# Patient Record
Sex: FEMALE | Race: WHITE | NOT HISPANIC OR LATINO | Employment: FULL TIME | URBAN - METROPOLITAN AREA
[De-identification: names, ages, dates, MRNs, and addresses within clinical notes are randomized per-mention and may not be internally consistent; named-entity substitution may affect disease eponyms.]

---

## 2017-02-16 RX ORDER — CITALOPRAM 20 MG/1
20 TABLET ORAL DAILY
Qty: 90 TABLET | Refills: 1 | Status: SHIPPED | OUTPATIENT
Start: 2017-02-16 | End: 2018-01-20 | Stop reason: SDUPTHER

## 2017-04-27 ENCOUNTER — OFFICE VISIT (OUTPATIENT)
Dept: INTERNAL MEDICINE | Facility: CLINIC | Age: 67
End: 2017-04-27

## 2017-04-27 VITALS
HEIGHT: 63 IN | HEART RATE: 73 BPM | TEMPERATURE: 97.4 F | OXYGEN SATURATION: 98 % | RESPIRATION RATE: 16 BRPM | DIASTOLIC BLOOD PRESSURE: 80 MMHG | WEIGHT: 133.8 LBS | SYSTOLIC BLOOD PRESSURE: 110 MMHG | BODY MASS INDEX: 23.71 KG/M2

## 2017-04-27 DIAGNOSIS — Z23 NEED FOR PNEUMOCOCCAL VACCINE: ICD-10-CM

## 2017-04-27 DIAGNOSIS — E05.90 HYPERTHYROIDISM: Primary | ICD-10-CM

## 2017-04-27 DIAGNOSIS — F32.9 REACTIVE DEPRESSION: ICD-10-CM

## 2017-04-27 DIAGNOSIS — Z11.59 NEED FOR HEPATITIS C SCREENING TEST: ICD-10-CM

## 2017-04-27 PROCEDURE — 99213 OFFICE O/P EST LOW 20 MIN: CPT | Performed by: INTERNAL MEDICINE

## 2017-04-27 RX ORDER — METHIMAZOLE 10 MG/1
10 TABLET ORAL DAILY
Qty: 90 TABLET | Refills: 1 | Status: SHIPPED | OUTPATIENT
Start: 2017-04-27 | End: 2018-09-15 | Stop reason: SDUPTHER

## 2017-04-27 RX ORDER — METHIMAZOLE 10 MG/1
10 TABLET ORAL DAILY
COMMUNITY
End: 2017-04-27 | Stop reason: SDUPTHER

## 2017-04-27 NOTE — PROGRESS NOTES
Subjective   Laverne Polk is a 67 y.o. female.     Chief Complaint   Patient presents with   • Thyroid Problem         Thyroid Problem   Presents for follow-up visit. Symptoms include anxiety, diarrhea and visual change. Patient reports no constipation, depressed mood, heat intolerance, hoarse voice, leg swelling, palpitations or tremors. The symptoms have been worsening.        The following portions of the patient's history were reviewed and updated as appropriate: allergies, current medications, past social history and problem list.    Outpatient Prescriptions Marked as Taking for the 4/27/17 encounter (Office Visit) with Berry Díaz MD   Medication Sig Dispense Refill   • citalopram (CeleXA) 20 MG tablet Take 1 tablet by mouth Daily. 90 tablet 1   • LORazepam (ATIVAN) 0.5 MG tablet Take 1 tablet by mouth At Night As Needed for anxiety. 30 tablet 2   • methIMAzole (TAPAZOLE) 10 MG tablet Take 10 mg by mouth Daily.         Review of Systems   Constitutional: Negative for chills and fever.   HENT: Negative for hoarse voice.    Cardiovascular: Negative for chest pain and palpitations.   Gastrointestinal: Positive for abdominal distention (midepigastric last night) and diarrhea. Negative for abdominal pain and constipation.   Endocrine: Negative for heat intolerance.   Neurological: Negative for tremors.   Psychiatric/Behavioral: The patient is nervous/anxious.        Objective   Vitals:    04/27/17 1500   BP: 110/80   Pulse: 73   Resp: 16   Temp: 97.4 °F (36.3 °C)   SpO2: 98%      Last Weight    04/27/17  1500   Weight: 133 lb 12.8 oz (60.7 kg)    [unfilled]  Body mass index is 23.7 kg/(m^2).      Physical Exam   Constitutional: She appears well-developed and well-nourished. No distress.   HENT:   Head: Normocephalic and atraumatic.   Neck: Carotid bruit is not present. No thyromegaly present.   Cardiovascular: Normal rate, regular rhythm, normal heart sounds and intact distal pulses.  Exam reveals no  gallop.    No murmur heard.  Pulmonary/Chest: Effort normal and breath sounds normal. No respiratory distress. She has no wheezes. She has no rales.   Abdominal: Soft. Bowel sounds are normal. She exhibits no mass. There is no tenderness. There is no guarding.         Problem List Items Addressed This Visit        Endocrine    Hyperthyroidism - Primary    Relevant Medications    methIMAzole (TAPAZOLE) 10 MG tablet    Other Relevant Orders    TSH    T3, Free    T4, Free    CBC & Differential    Comprehensive Metabolic Panel    Lipid Panel       Other    Depression    Relevant Orders    CBC & Differential    Comprehensive Metabolic Panel        Assessment/Plan   In today for recheck.  She's been off methimazole for up to 6 months.  She feels like her thyroid is getting high again.  She's had some increased diarrhea and floaters.  Feels like she swelling.  She restarted her methimazole 10 days ago.  She started 10 mg 2 or 3 times a day.  We'll check her annual lab work today including CBC, CMP, lipids.  Will also had a TSH, free T3, free T4.  We'll continue to monitor at six-month intervals.  Depression is doing very well.  She's getting  in the fall.  She's had a promotion at work.  She knows she is due for mammogram and she'll schedule that in the near future.  She is too busy right now.  Pneumovax today.  Prevnar in one year.  She has no interest in colonoscopy or zoster.         Dragon disclaimer:   Much of this encounter note is an electronic transcription/translation of spoken language to printed text. The electronic translation of spoken language may permit erroneous, or at times, nonsensical words or phrases to be inadvertently transcribed; Although I have reviewed the note for such errors, some may still exist.

## 2017-04-28 LAB
ALBUMIN SERPL-MCNC: 4.7 G/DL (ref 3.5–5.2)
ALBUMIN/GLOB SERPL: 1.5 G/DL
ALP SERPL-CCNC: 273 U/L (ref 39–117)
ALT SERPL-CCNC: 17 U/L (ref 1–33)
AST SERPL-CCNC: 17 U/L (ref 1–32)
BASOPHILS # BLD AUTO: 0.04 10*3/MM3 (ref 0–0.2)
BASOPHILS NFR BLD AUTO: 0.4 % (ref 0–1.5)
BILIRUB SERPL-MCNC: 0.2 MG/DL (ref 0.1–1.2)
BUN SERPL-MCNC: 14 MG/DL (ref 8–23)
BUN/CREAT SERPL: 19.2 (ref 7–25)
CALCIUM SERPL-MCNC: 10.5 MG/DL (ref 8.6–10.5)
CHLORIDE SERPL-SCNC: 99 MMOL/L (ref 98–107)
CHOLEST SERPL-MCNC: 172 MG/DL (ref 0–200)
CO2 SERPL-SCNC: 26.9 MMOL/L (ref 22–29)
CREAT SERPL-MCNC: 0.73 MG/DL (ref 0.57–1)
EOSINOPHIL # BLD AUTO: 0.21 10*3/MM3 (ref 0–0.7)
EOSINOPHIL NFR BLD AUTO: 2.2 % (ref 0.3–6.2)
ERYTHROCYTE [DISTWIDTH] IN BLOOD BY AUTOMATED COUNT: 14 % (ref 11.7–13)
GLOBULIN SER CALC-MCNC: 3.2 GM/DL
GLUCOSE SERPL-MCNC: 91 MG/DL (ref 65–99)
HCT VFR BLD AUTO: 40.3 % (ref 35.6–45.5)
HCV AB S/CO SERPL IA: <0.1 S/CO RATIO (ref 0–0.9)
HDLC SERPL-MCNC: 71 MG/DL (ref 40–60)
HGB BLD-MCNC: 13.2 G/DL (ref 11.9–15.5)
IMM GRANULOCYTES # BLD: 0.02 10*3/MM3 (ref 0–0.03)
IMM GRANULOCYTES NFR BLD: 0.2 % (ref 0–0.5)
LDLC SERPL CALC-MCNC: 77 MG/DL (ref 0–100)
LYMPHOCYTES # BLD AUTO: 3.24 10*3/MM3 (ref 0.9–4.8)
LYMPHOCYTES NFR BLD AUTO: 33.9 % (ref 19.6–45.3)
MCH RBC QN AUTO: 27.8 PG (ref 26.9–32)
MCHC RBC AUTO-ENTMCNC: 32.8 G/DL (ref 32.4–36.3)
MCV RBC AUTO: 84.8 FL (ref 80.5–98.2)
MONOCYTES # BLD AUTO: 0.8 10*3/MM3 (ref 0.2–1.2)
MONOCYTES NFR BLD AUTO: 8.4 % (ref 5–12)
NEUTROPHILS # BLD AUTO: 5.25 10*3/MM3 (ref 1.9–8.1)
NEUTROPHILS NFR BLD AUTO: 54.9 % (ref 42.7–76)
PLATELET # BLD AUTO: 321 10*3/MM3 (ref 140–500)
POTASSIUM SERPL-SCNC: 4.8 MMOL/L (ref 3.5–5.2)
PROT SERPL-MCNC: 7.9 G/DL (ref 6–8.5)
RBC # BLD AUTO: 4.75 10*6/MM3 (ref 3.9–5.2)
SODIUM SERPL-SCNC: 139 MMOL/L (ref 136–145)
T3FREE SERPL-MCNC: 5.5 PG/ML (ref 2–4.4)
T4 FREE SERPL-MCNC: 1.56 NG/DL (ref 0.93–1.7)
TRIGL SERPL-MCNC: 118 MG/DL (ref 0–150)
TSH SERPL DL<=0.005 MIU/L-ACNC: <0.005 MIU/ML (ref 0.27–4.2)
VLDLC SERPL CALC-MCNC: 23.6 MG/DL (ref 5–40)
WBC # BLD AUTO: 9.56 10*3/MM3 (ref 4.5–10.7)

## 2017-05-01 ENCOUNTER — TELEPHONE (OUTPATIENT)
Dept: INTERNAL MEDICINE | Facility: CLINIC | Age: 67
End: 2017-05-01

## 2017-05-01 RX ORDER — CEPHALEXIN 500 MG/1
500 CAPSULE ORAL 3 TIMES DAILY
Qty: 15 CAPSULE | Refills: 0 | Status: SHIPPED | OUTPATIENT
Start: 2017-05-01 | End: 2017-05-06

## 2017-07-10 RX ORDER — LORAZEPAM 0.5 MG/1
TABLET ORAL
Qty: 30 TABLET | Refills: 1
Start: 2017-07-10 | End: 2018-01-20 | Stop reason: SDUPTHER

## 2017-07-10 NOTE — TELEPHONE ENCOUNTER
Last Refill:  10.05.2017  Last Visit:    04.27.2017  Next Visit:    11.10.2017  Zach:        07.10.2017

## 2017-11-27 ENCOUNTER — OFFICE VISIT (OUTPATIENT)
Dept: INTERNAL MEDICINE | Facility: CLINIC | Age: 67
End: 2017-11-27

## 2017-11-27 VITALS
BODY MASS INDEX: 24.24 KG/M2 | SYSTOLIC BLOOD PRESSURE: 114 MMHG | OXYGEN SATURATION: 96 % | TEMPERATURE: 98.4 F | RESPIRATION RATE: 16 BRPM | WEIGHT: 136.8 LBS | DIASTOLIC BLOOD PRESSURE: 64 MMHG | HEART RATE: 77 BPM | HEIGHT: 63 IN

## 2017-11-27 DIAGNOSIS — E05.90 HYPERTHYROIDISM: ICD-10-CM

## 2017-11-27 DIAGNOSIS — N28.1 ACQUIRED COMPLEX RENAL CYST: ICD-10-CM

## 2017-11-27 DIAGNOSIS — Z23 NEED FOR IMMUNIZATION AGAINST INFLUENZA: Primary | ICD-10-CM

## 2017-11-27 PROCEDURE — G0439 PPPS, SUBSEQ VISIT: HCPCS | Performed by: INTERNAL MEDICINE

## 2017-11-27 PROCEDURE — G0008 ADMIN INFLUENZA VIRUS VAC: HCPCS | Performed by: INTERNAL MEDICINE

## 2017-11-27 PROCEDURE — 99213 OFFICE O/P EST LOW 20 MIN: CPT | Performed by: INTERNAL MEDICINE

## 2017-11-27 PROCEDURE — 90686 IIV4 VACC NO PRSV 0.5 ML IM: CPT | Performed by: INTERNAL MEDICINE

## 2017-11-27 NOTE — PROGRESS NOTES
Subjective   Laverne Polk is a 67 y.o. female.     Chief Complaint   Patient presents with   • Thyroid Problem         Thyroid Problem   Presents for follow-up visit. Symptoms include weight gain. Patient reports no anxiety, cold intolerance, hair loss, heat intolerance, palpitations, tremors or weight loss. The symptoms have been worsening.        The following portions of the patient's history were reviewed and updated as appropriate: allergies, current medications, past social history and problem list.    Outpatient Prescriptions Marked as Taking for the 11/27/17 encounter (Office Visit) with Berry Díaz MD   Medication Sig Dispense Refill   • citalopram (CeleXA) 20 MG tablet Take 1 tablet by mouth Daily. 90 tablet 1   • LORazepam (ATIVAN) 0.5 MG tablet TAKE 1 TABLET BY MOUTH AT BEDTIME AS NEEDED FOR ANXIETY 30 tablet 1       Review of Systems   Constitutional: Positive for weight gain. Negative for chills, fever and weight loss.   Respiratory: Negative for shortness of breath.    Cardiovascular: Negative for palpitations.   Endocrine: Negative for cold intolerance and heat intolerance.   Neurological: Negative for tremors.   Psychiatric/Behavioral: The patient is not nervous/anxious and is not hyperactive.        Objective   Vitals:    11/27/17 1406   BP: 114/64   Pulse: 77   Resp: 16   Temp: 98.4 °F (36.9 °C)   SpO2: 96%      Last Weight    11/27/17  1406   Weight: 136 lb 12.8 oz (62.1 kg)    [unfilled]  Body mass index is 24.23 kg/(m^2).      Physical Exam   Constitutional: She appears well-developed and well-nourished.   Cardiovascular: Normal rate, regular rhythm and normal heart sounds.  Exam reveals no friction rub.    No murmur heard.  Pulmonary/Chest: Effort normal and breath sounds normal. No respiratory distress. She has no wheezes. She has no rales.   Neurological: She has normal reflexes.         Problem List Items Addressed This Visit        Endocrine    Hyperthyroidism      Other Visit  Diagnoses     Need for immunization against influenza    -  Primary    Relevant Orders    Flu Vaccine Quad PF 3YR+ (Completed)        Assessment/Plan   Today with neck ache.  Indigestion-type ache in the upper abdomen over the past few days.  She's had her gallbladder out in the past.  She had a small cyst in the right kidney 2 years ago.  It actually collapsed from 26 down to 10 mm at the time but was complex.  She is going to need another CT of the abdomen to recheck this.  Current GI symptoms are nondescript.  She's due for recheck on her thyroid.  He been running hyperthyroid in the past for had gotten thyroid completely controlled.  She quit her methimazole about 3 months ago.  AWV today.  She knows she needs to schedule another mammogram.  His been about 4 years since last one.  Flu shot updated today.         Dragon disclaimer:   Much of this encounter note is an electronic transcription/translation of spoken language to printed text. The electronic translation of spoken language may permit erroneous, or at times, nonsensical words or phrases to be inadvertently transcribed; Although I have reviewed the note for such errors, some may still exist.

## 2017-11-27 NOTE — PROGRESS NOTES
QUICK REFERENCE INFORMATION:  The ABCs of the Annual Wellness Visit    Subsequent Medicare Wellness Visit    HEALTH RISK ASSESSMENT    1950    Recent Hospitalizations:  No hospitalization(s) within the last year..        Current Medical Providers:  Patient Care Team:  Berry Díaz MD as PCP - Internal Medicine        Smoking Status:  History   Smoking Status   • Never Smoker   Smokeless Tobacco   • Never Used       Alcohol Consumption:  History   Alcohol Use   • Yes     Comment: Wine: Occas       Depression Screen:   PHQ-2/PHQ-9 Depression Screening 11/27/2017   Little interest or pleasure in doing things 0   Feeling down, depressed, or hopeless 0   Trouble falling or staying asleep, or sleeping too much 3   Feeling tired or having little energy 1   Poor appetite or overeating 0   Feeling bad about yourself - or that you are a failure or have let yourself or your family down 0   Trouble concentrating on things, such as reading the newspaper or watching television 0   Moving or speaking so slowly that other people could have noticed. Or the opposite - being so fidgety or restless that you have been moving around a lot more than usual 0   Thoughts that you would be better off dead, or of hurting yourself in some way 0   Total Score 4   If you checked off any problems, how difficult have these problems made it for you to do your work, take care of things at home, or get along with other people? Somewhat difficult       Health Habits and Functional and Cognitive Screening:  Functional & Cognitive Status 11/27/2017   Do you have difficulty preparing food and eating? No   Do you have difficulty bathing yourself, getting dressed or grooming yourself? No   Do you have difficulty using the toilet? No   Do you have difficulty moving around from place to place? No   Do you have trouble with steps or getting out of a bed or a chair? No   In the past year have you fallen or experienced a near fall? No   Current Diet Well  Balanced Diet   Dental Exam Up to date   Eye Exam Up to date   Exercise (times per week) 5 times per week   Current Exercise Activities Include Walking   Do you need help using the phone?  No   Are you deaf or do you have serious difficulty hearing?  No   Do you need help with transportation? No   Do you need help shopping? No   Do you need help preparing meals?  No   Do you need help with housework?  No   Do you need help with laundry? No   Do you need help taking your medications? No   Do you need help managing money? No   Have you felt unusual stress, anger or loneliness in the last month? No   Who do you live with? Alone   If you need help, do you have trouble finding someone available to you? No   Have you been bothered in the last four weeks by sexual problems? No   Do you have difficulty concentrating, remembering or making decisions? No           Does the patient have evidence of cognitive impairment? No    Aspirin use counseling: Does not need ASA (and currently is not on it)      Recent Lab Results:  CMP:  Lab Results   Component Value Date    GLU 91 04/27/2017    BUN 14 04/27/2017    CREATININE 0.73 04/27/2017    EGFRIFNONA 79 04/27/2017    EGFRIFAFRI 96 04/27/2017    BCR 19.2 04/27/2017     04/27/2017    K 4.8 04/27/2017    CO2 26.9 04/27/2017    CALCIUM 10.5 04/27/2017    PROTENTOTREF 7.9 04/27/2017    ALBUMIN 4.70 04/27/2017    LABGLOBREF 3.2 04/27/2017    LABIL2 1.5 04/27/2017    BILITOT 0.2 04/27/2017    ALKPHOS 273 (H) 04/27/2017    AST 17 04/27/2017    ALT 17 04/27/2017     Lipid Panel:  Lab Results   Component Value Date    TRIG 118 04/27/2017    HDL 71 (H) 04/27/2017    VLDL 23.6 04/27/2017     HbA1c:       Visual Acuity:  No exam data present    Age-appropriate Screening Schedule:  Refer to the list below for future screening recommendations based on patient's age, sex and/or medical conditions. Orders for these recommended tests are listed in the plan section. The patient has been  "provided with a written plan.    Health Maintenance   Topic Date Due   • TDAP/TD VACCINES (1 - Tdap) 01/02/2011   • MAMMOGRAM  07/01/2016   • INFLUENZA VACCINE  08/01/2017   • PNEUMOCOCCAL VACCINES (65+ LOW/MEDIUM RISK) (2 of 2 - PCV13) 04/27/2018   • COLONOSCOPY  Excluded   • ZOSTER VACCINE  Excluded        Subjective   History of Present Illness    Laverne Polk is a 67 y.o. female who presents for an Subsequent Wellness Visit.    The following portions of the patient's history were reviewed and updated as appropriate: allergies, current medications, past family history, past medical history, past social history, past surgical history and problem list.    Outpatient Medications Prior to Visit   Medication Sig Dispense Refill   • citalopram (CeleXA) 20 MG tablet Take 1 tablet by mouth Daily. 90 tablet 1   • LORazepam (ATIVAN) 0.5 MG tablet TAKE 1 TABLET BY MOUTH AT BEDTIME AS NEEDED FOR ANXIETY 30 tablet 1   • methIMAzole (TAPAZOLE) 10 MG tablet Take 1 tablet by mouth Daily. 90 tablet 1     No facility-administered medications prior to visit.        Patient Active Problem List   Diagnosis   • Depression   • Hyperthyroidism   • AR (allergic rhinitis)       Advance Care Planning:  has NO advance directive - information provided to the patient today      Review of Systems    Compared to one year ago, the patient feels her physical health is the same.  Compared to one year ago, the patient feels her mental health is the same.    Objective     Physical Exam    Vitals:    11/27/17 1406   BP: 114/64   BP Location: Left arm   Patient Position: Sitting   Cuff Size: Adult   Pulse: 77   Resp: 16   Temp: 98.4 °F (36.9 °C)   TempSrc: Oral   SpO2: 96%   Weight: 136 lb 12.8 oz (62.1 kg)   Height: 63\" (160 cm)   PainSc:   2   PainLoc: Neck       Body mass index is 24.23 kg/(m^2).  Discussed the patient's BMI with her. The BMI is in the acceptable range.    Assessment/Plan   Patient Self-Management and Personalized Health " Advice  The patient has been provided with information about: diet, exercise, fall prevention and designing advance directives and preventive services including:   · Advance directive, Exercise counseling provided, Influenza vaccine, Nutrition counseling provided, Smoking cessation counseling completed.    Visit Diagnoses:    ICD-10-CM ICD-9-CM   1. Need for immunization against influenza Z23 V04.81       Orders Placed This Encounter   Procedures   • Flu Vaccine Quad PF 3YR+       Outpatient Encounter Prescriptions as of 11/27/2017   Medication Sig Dispense Refill   • citalopram (CeleXA) 20 MG tablet Take 1 tablet by mouth Daily. 90 tablet 1   • LORazepam (ATIVAN) 0.5 MG tablet TAKE 1 TABLET BY MOUTH AT BEDTIME AS NEEDED FOR ANXIETY 30 tablet 1   • methIMAzole (TAPAZOLE) 10 MG tablet Take 1 tablet by mouth Daily. 90 tablet 1     No facility-administered encounter medications on file as of 11/27/2017.        Reviewed use of high risk medication in the elderly: yes  Reviewed for potential of harmful drug interactions in the elderly: yes    Follow Up:  No Follow-up on file.     An After Visit Summary and PPPS with all of these plans were given to the patient.

## 2017-11-27 NOTE — PATIENT INSTRUCTIONS
Influenza Virus Vaccine injection  What is this medicine?  INFLUENZA VIRUS VACCINE (in [facility]) helps to reduce the risk of getting influenza also known as the flu. The vaccine only helps protect you against some strains of the flu.  This medicine may be used for other purposes; ask your health care provider or pharmacist if you have questions.  COMMON BRAND NAME(S): Afluria, Agriflu, Alfuria, FLUAD, Fluarix, Fluarix Quadrivalent, Flublok, FLUCELVAX, Flulaval, Fluvirin, Fluzone, Fluzone High-Dose, Fluzone Intradermal  What should I tell my health care provider before I take this medicine?  They need to know if you have any of these conditions:  -bleeding disorder like hemophilia  -fever or infection  -Guillain-[place] syndrome or other neurological problems  -immune system problems  -infection with the human immunodeficiency virus (HIV) or AIDS  -low blood platelet counts  -multiple sclerosis  -an unusual or allergic reaction to influenza virus vaccine, latex, other medicines, foods, dyes, or preservatives. Different brands of vaccines contain different allergens. Some may contain latex or eggs. Talk to your doctor about your allergies to make sure that you get the right vaccine.  -pregnant or trying to get pregnant  -breast-feeding  How should I use this medicine?  This vaccine is for injection into a muscle or under the skin. It is given by a health care professional.  A copy of Vaccine Information Statements will be given before each vaccination. Read this sheet carefully each time. The sheet may change frequently.  Talk to your healthcare provider to see which vaccines are right for you. Some vaccines should not be used in all age groups.  Overdosage: If you think you have taken too much of this medicine contact a poison control center or emergency room at once.  NOTE: This medicine is only for you. Do not share this medicine with others.  What if I miss a dose?  This does not apply.  What  may interact with this medicine?  -chemotherapy or radiation therapy  -medicines that lower your immune system like etanercept, anakinra, infliximab, and adalimumab  -medicines that treat or prevent blood clots like warfarin  -phenytoin  -steroid medicines like prednisone or cortisone  -theophylline  -vaccines  This list may not describe all possible interactions. Give your health care provider a list of all the medicines, herbs, non-prescription drugs, or dietary supplements you use. Also tell them if you smoke, drink alcohol, or use illegal drugs. Some items may interact with your medicine.  What should I watch for while using this medicine?  Report any side effects that do not go away within 3 days to your doctor or health care professional. Call your health care provider if any unusual symptoms occur within 6 weeks of receiving this vaccine.  You may still catch the flu, but the illness is not usually as bad. You cannot get the flu from the vaccine. The vaccine will not protect against colds or other illnesses that may cause fever. The vaccine is needed every year.  What side effects may I notice from receiving this medicine?  Side effects that you should report to your doctor or health care professional as soon as possible:  -allergic reactions like skin rash, itching or hives, swelling of the face, lips, or tongue  Side effects that usually do not require medical attention (report to your doctor or health care professional if they continue or are bothersome):  -fever  -headache  -muscle aches and pains  -pain, tenderness, redness, or swelling at the injection site  -tiredness  This list may not describe all possible side effects. Call your doctor for medical advice about side effects. You may report side effects to FDA at 9-847-FDA-1386.  Where should I keep my medicine?  The vaccine will be given by a health care professional in a clinic, pharmacy, doctor's office, or other health care setting. You will not  be given vaccine doses to store at home.  NOTE: This sheet is a summary. It may not cover all possible information. If you have questions about this medicine, talk to your doctor, pharmacist, or health care provider.     © 2017, [employer]. (2016-07-08 10:07:28)  Fat and Cholesterol Restricted Diet  High levels of fat and cholesterol in your blood may lead to various health problems, such as diseases of the heart, blood vessels, gallbladder, liver, and pancreas. Fats are concentrated sources of energy that come in various forms. Certain types of fat, including saturated fat, may be harmful in excess. Cholesterol is a substance needed by your body in small amounts. Your body makes all the cholesterol it needs. Excess cholesterol comes from the food you eat.  When you have high levels of cholesterol and saturated fat in your blood, health problems can develop because the excess fat and cholesterol will gather along the walls of your blood vessels, causing them to narrow. Choosing the right foods will help you control your intake of fat and cholesterol. This will help keep the levels of these substances in your blood within normal limits and reduce your risk of disease.  WHAT IS MY PLAN?  Your health care provider recommends that you:  · Get no more than __________ % of the total calories in your daily diet from fat.  · Limit your intake of saturated fat to less than ______% of your total calories each day.  · Limit the amount of cholesterol in your diet to less than _________mg per day.  WHAT TYPES OF FAT SHOULD I CHOOSE?  · Choose healthy fats more often. Choose monounsaturated and polyunsaturated fats, such as olive and canola oil, flaxseeds, walnuts, almonds, and seeds.  · Eat more omega-3 fats. Good choices include salmon, mackerel, sardines, tuna, flaxseed oil, and ground flaxseeds. Aim to eat fish at least two times a week.  · Limit saturated fats. Saturated fats are primarily found in animal  "products, such as meats, butter, and cream. Plant sources of saturated fats include palm oil, palm kernel oil, and coconut oil.  · Avoid foods with partially hydrogenated oils in them. These contain trans fats. Examples of foods that contain trans fats are stick margarine, some tub margarines, cookies, crackers, and other baked goods.  WHAT GENERAL GUIDELINES DO I NEED TO FOLLOW?  These guidelines for healthy eating will help you control your intake of fat and cholesterol:  · Check food labels carefully to identify foods with trans fats or high amounts of saturated fat.  · Fill one half of your plate with vegetables and green salads.  · Fill one fourth of your plate with whole grains. Look for the word \"whole\" as the first word in the ingredient list.  · Fill one fourth of your plate with lean protein foods.  · Limit fruit to two servings a day. Choose fruit instead of juice.  · Eat more foods that contain soluble fiber. Examples of foods that contain this type of fiber are apples, broccoli, carrots, beans, peas, and barley. Aim to get 20-30 g of fiber per day.  · Eat more home-cooked food and less restaurant, buffet, and fast food.  · Limit or avoid alcohol.  · Limit foods high in starch and sugar.  · Limit fried foods.  · Cook foods using methods other than frying. Baking, boiling, grilling, and broiling are all great options.  · Lose weight if you are overweight. Losing just 5-10% of your initial body weight can help your overall health and prevent diseases such as diabetes and heart disease.  WHAT FOODS CAN I EAT?  Grains  Whole grains, such as whole wheat or whole grain breads, crackers, cereals, and pasta. Unsweetened oatmeal, bulgur, barley, quinoa, or brown rice. Corn or whole wheat flour tortillas.  Vegetables  Fresh or frozen vegetables (raw, steamed, roasted, or grilled). Green salads.  Fruits  All fresh, canned (in natural juice), or frozen fruits.  Meat and Other Protein Products  Ground beef (85% or " leaner), grass-fed beef, or beef trimmed of fat. Skinless chicken or turkey. Ground chicken or turkey. Pork trimmed of fat. All fish and seafood. Eggs. Dried beans, peas, or lentils. Unsalted nuts or seeds. Unsalted canned or dry beans.  Dairy  Low-fat dairy products, such as skim or 1% milk, 2% or reduced-fat cheeses, low-fat ricotta or cottage cheese, or plain low-fat yogurt.  Fats and Oils  Tub margarines without trans fats. Light or reduced-fat mayonnaise and salad dressings. Avocado. Olive, canola, sesame, or safflower oils. Natural peanut or almond butter (choose ones without added sugar and oil).  The items listed above may not be a complete list of recommended foods or beverages. Contact your dietitian for more options.  WHAT FOODS ARE NOT RECOMMENDED?  Grains  White bread. White pasta. White rice. Cornbread. Bagels, pastries, and croissants. Crackers that contain trans fat.  Vegetables  White potatoes. Corn. Creamed or fried vegetables. Vegetables in a cheese sauce.  Fruits  Dried fruits. Canned fruit in light or heavy syrup. Fruit juice.  Meat and Other Protein Products  Fatty cuts of meat. Ribs, chicken wings, nieves, sausage, bologna, salami, chitterlings, fatback, hot dogs, bratwurst, and packaged luncheon meats. Liver and organ meats.  Dairy  Whole or 2% milk, cream, half-and-half, and cream cheese. Whole milk cheeses. Whole-fat or sweetened yogurt. Full-fat cheeses. Nondairy creamers and whipped toppings. Processed cheese, cheese spreads, or cheese curds.  Sweets and Desserts  Corn syrup, sugars, honey, and molasses. Candy. Jam and jelly. Syrup. Sweetened cereals. Cookies, pies, cakes, donuts, muffins, and ice cream.  Fats and Oils  Butter, stick margarine, lard, shortening, ghee, or nieves fat. Coconut, palm kernel, or palm oils.  Beverages  Alcohol. Sweetened drinks (such as sodas, lemonade, and fruit drinks or punches).  The items listed above may not be a complete list of foods and beverages to  avoid. Contact your dietitian for more information.     This information is not intended to replace advice given to you by your health care provider. Make sure you discuss any questions you have with your health care provider.     Document Released: 12/18/2006 Document Revised: 01/08/2016 Document Reviewed: 03/18/2015  [employer] Interactive Patient Education ©2017 [employer]  Fall Prevention in the Home   Falls can cause injuries and can affect people from all age groups. There are many simple things that you can do to make your home safe and to help prevent falls.  WHAT CAN I DO ON THE OUTSIDE OF MY HOME?  · Regularly repair the edges of walkways and driveways and fix any cracks.  · Remove high doorway thresholds.  · Trim any shrubbery on the main path into your home.  · Use bright outdoor lighting.  · Clear walkways of debris and clutter, including tools and rocks.  · Regularly check that handrails are securely fastened and in good repair. Both sides of any steps should have handrails.  · Install guardrails along the edges of any raised decks or porches.  · Have leaves, snow, and ice cleared regularly.  · Use sand or salt on walkways during winter months.  · In the garage, clean up any spills right away, including grease or oil spills.  WHAT CAN I DO IN THE BATHROOM?  · Use night lights.  · Install grab bars by the toilet and in the tub and shower. Do not use towel bars as grab bars.  · Use non-skid mats or decals on the floor of the tub or shower.  · If you need to sit down while you are in the shower, use a plastic, non-slip stool.  · Keep the floor dry. Immediately clean up any water that spills on the floor.  · Remove soap buildup in the tub or shower on a regular basis.  · Attach bath mats securely with double-sided non-slip rug tape.  · Remove throw rugs and other tripping hazards from the floor.  WHAT CAN I DO IN THE BEDROOM?  · Use night lights.  · Make sure that a bedside light is easy to reach.  · Do  not use oversized bedding that drapes onto the floor.  · Have a firm chair that has side arms to use for getting dressed.  · Remove throw rugs and other tripping hazards from the floor.  WHAT CAN I DO IN THE KITCHEN?   · Clean up any spills right away.  · Avoid walking on wet floors.  · Place frequently used items in easy-to-reach places.  · If you need to reach for something above you, use a sturdy step stool that has a grab bar.  · Keep electrical cables out of the way.  · Do not use floor polish or wax that makes floors slippery. If you have to use wax, make sure that it is non-skid floor wax.  · Remove throw rugs and other tripping hazards from the floor.  WHAT CAN I DO IN THE STAIRWAYS?  · Do not leave any items on the stairs.  · Make sure that there are handrails on both sides of the stairs. Fix handrails that are broken or loose. Make sure that handrails are as long as the stairways.  · Check any carpeting to make sure that it is firmly attached to the stairs. Fix any carpet that is loose or worn.  · Avoid having throw rugs at the top or bottom of stairways, or secure the rugs with carpet tape to prevent them from moving.  · Make sure that you have a light switch at the top of the stairs and the bottom of the stairs. If you do not have them, have them installed.  WHAT ARE SOME OTHER FALL PREVENTION TIPS?  · Wear closed-toe shoes that fit well and support your feet. Wear shoes that have rubber soles or low heels.  · When you use a stepladder, make sure that it is completely opened and that the sides are firmly locked. Have someone hold the ladder while you are using it. Do not climb a closed stepladder.  · Add color or contrast paint or tape to grab bars and handrails in your home. Place contrasting color strips on the first and last steps.  · Use mobility aids as needed, such as canes, walkers, scooters, and crutches.  · Turn on lights if it is dark. Replace any light bulbs that burn out.  · Set up furniture  so that there are clear paths. Keep the furniture in the same spot.  · Fix any uneven floor surfaces.  · Choose a carpet design that does not hide the edge of steps of a stairway.  · Be aware of any and all pets.  · Review your medicines with your healthcare provider. Some medicines can cause dizziness or changes in blood pressure, which increase your risk of falling.  Talk with your health care provider about other ways that you can decrease your risk of falls. This may include working with a physical therapist or  to improve your strength, balance, and endurance.     This information is not intended to replace advice given to you by your health care provider. Make sure you discuss any questions you have with your health care provider.     Document Released: 12/08/2003 Document Revised: 04/10/2017 Document Reviewed: 01/22/2016  [employer] Patient Education ©2017 [employer]  Exercising to Stay Healthy  Exercising regularly is important. It has many health benefits, such as:  · Improving your overall fitness, flexibility, and endurance.  · Increasing your bone density.  · Helping with weight control.  · Decreasing your body fat.  · Increasing your muscle strength.  · Reducing stress and tension.  · Improving your overall health.  In order to become healthy and stay healthy, it is recommended that you do moderate-intensity and vigorous-intensity exercise. You can tell that you are exercising at a moderate intensity if you have a higher heart rate and faster breathing, but you are still able to hold a conversation. You can tell that you are exercising at a vigorous intensity if you are breathing much harder and faster and cannot hold a conversation while exercising.  HOW OFTEN SHOULD I EXERCISE?  Choose an activity that you enjoy and set realistic goals. Your health care provider can help you to make an activity plan that works for you. Exercise regularly as directed by your health care provider.  This may include:   · Doing resistance training twice each week, such as:    Push-ups.    Sit-ups.    Lifting weights.    Using resistance bands.  · Doing a given intensity of exercise for a given amount of time. Choose from these options:    150 minutes of moderate-intensity exercise every week.    75 minutes of vigorous-intensity exercise every week.    A mix of moderate-intensity and vigorous-intensity exercise every week.  Children, pregnant women, people who are out of shape, people who are overweight, and older adults may need to consult a health care provider for individual recommendations. If you have any sort of medical condition, be sure to consult your health care provider before starting a new exercise program.   WHAT ARE SOME EXERCISE IDEAS?  Some moderate-intensity exercise ideas include:   · Walking at a rate of 1 mile in 15 minutes.  · Biking.  · Hiking.  · Golfing.  · Dancing.  Some vigorous-intensity exercise ideas include:   · Walking at a rate of at least 4.5 miles per hour.  · Jogging or running at a rate of 5 miles per hour.  · Biking at a rate of at least 10 miles per hour.  · Lap swimming.  · Roller-skating or in-line skating.  · Cross-country skiing.  · Vigorous competitive sports, such as football, basketball, and soccer.  · Jumping rope.  · Aerobic dancing.  WHAT ARE SOME EVERYDAY ACTIVITIES THAT CAN HELP ME TO GET EXERCISE?  · Yard work, such as:    Pushing a .    Raking and bagging leaves.  · Washing and waxing your car.  · Pushing a stroller.  · Shoveling snow.  · Gardening.  · Washing windows or floors.  HOW CAN I BE MORE ACTIVE IN MY DAY-TO-DAY ACTIVITIES?  · Use the stairs instead of the elevator.  · Take a walk during your lunch break.  · If you drive, park your car farther away from work or school.  · If you take public transportation, get off one stop early and walk the rest of the way.  · Make all of your phone calls while standing up and walking around.  · Get up,  stretch, and walk around every 30 minutes throughout the day.  WHAT GUIDELINES SHOULD I FOLLOW WHILE EXERCISING?  · Do not exercise so much that you hurt yourself, feel dizzy, or get very short of breath.  · Consult your health care provider before starting a new exercise program.  · Wear comfortable clothes and shoes with good support.  · Drink plenty of water while you exercise to prevent dehydration or heat stroke. Body water is lost during exercise and must be replaced.  · Work out until you breathe faster and your heart beats faster.     This information is not intended to replace advice given to you by your health care provider. Make sure you discuss any questions you have with your health care provider.     Document Released: 01/20/2012 Document Revised: 01/08/2016 Document Reviewed: 05/21/2015  [employer] Interactive Patient Education ©2017 [employer]  Advance Directive  Advance directives are the legal documents that allow you to make choices about your health care and medical treatment if you cannot speak for yourself. Advance directives are a way for you to communicate your wishes to family, friends, and health care providers. The specified people can then convey your decisions about end-of-life care to avoid confusion if you should become unable to communicate.  Ideally, the process of discussing and writing advance directives should happen over time rather than making decisions all at once. Advance directives can be modified as your situation changes, and you can change your mind at any time, even after you have signed the advance directives. Each state has its own laws regarding advance directives.  You may want to check with your health care provider, , or state representative about the law in your state. Below are some examples of advance directives.  HEALTH CARE PROXY AND DURABLE POWER OF  FOR HEALTH CARE  A health care proxy is a person (agent) appointed to make medical decisions  for you if you cannot. Generally, people choose someone they know well and trust to represent their preferences when they can no longer do so. You should be sure to ask this person for agreement to act as your agent. An agent may have to exercise judgment in the event of a medical decision for which your wishes are not known.  A durable power of  for health care is a legal document that names your health care proxy. Depending on the laws in your state, after the document is written, it may also need to be:  · Signed.  · Notarized.  · Dated.  · Copied.  · Witnessed.  · Incorporated into your medical record.  You may also want to appoint someone to manage your financial affairs if you cannot. This is called a durable power of  for finances. It is a separate legal document from the durable power of  for health care. You may choose the same person or someone different from your health care proxy to act as your agent in financial matters.  LIVING WILL  A living will is a set of instructions documenting your wishes about medical care when you cannot care for yourself. It is used if you become:  · Terminally ill.  · Incapacitated.  · Unable to communicate.  · Unable to make decisions.  Items to consider in your living will include:  · The use or non-use of life-sustaining equipment, such as dialysis machines and breathing machines (ventilators).  · A do not resuscitate (DNR) order, which is the instruction not to use cardiopulmonary resuscitation (CPR) if breathing or heartbeat stops.  · Tube feeding.  · Withholding of food and fluids.  · Comfort (palliative) care when the goal becomes comfort rather than a cure.  · Organ and tissue donation.  A living will does not give instructions about distribution of your money and property if you should pass away. It is advisable to seek the expert advice of a  in drawing up a will regarding your possessions. Decisions about taxes, beneficiaries, and  asset distribution will be legally binding. This process can relieve your family and friends of any burdens surrounding disputes or questions that may come up about the allocation of your assets.  DO NOT RESUSCITATE (DNR)  A do not resuscitate (DNR) order is a request to not have CPR in the event that your heart stops beating or you stop breathing. Unless given other instructions, a health care provider will try to help any patient whose heart has stopped or who has stopped breathing.   This information is not intended to replace advice given to you by your health care provider. Make sure you discuss any questions you have with your health care provider.  Document Released: 03/26/2009 Document Revised: 04/10/2017 Document Reviewed: 05/07/2014  [employer] Interactive Patient Education © 2017 [employer]

## 2017-11-28 ENCOUNTER — TELEPHONE (OUTPATIENT)
Dept: INTERNAL MEDICINE | Facility: CLINIC | Age: 67
End: 2017-11-28

## 2017-11-28 LAB
T3FREE SERPL-MCNC: 6.1 PG/ML (ref 2–4.4)
T4 FREE SERPL-MCNC: 1.72 NG/DL (ref 0.93–1.7)
TSH SERPL DL<=0.005 MIU/L-ACNC: <0.005 MIU/ML (ref 0.27–4.2)

## 2017-12-03 ENCOUNTER — HOSPITAL ENCOUNTER (EMERGENCY)
Facility: HOSPITAL | Age: 67
Discharge: HOME | End: 2017-12-03
Attending: FAMILY MEDICINE | Admitting: FAMILY MEDICINE

## 2017-12-03 ENCOUNTER — APPOINTMENT (OUTPATIENT)
Dept: CT IMAGING | Facility: HOSPITAL | Age: 67
End: 2017-12-03

## 2017-12-03 VITALS
OXYGEN SATURATION: 99 % | BODY MASS INDEX: 24.1 KG/M2 | WEIGHT: 136 LBS | TEMPERATURE: 96.3 F | DIASTOLIC BLOOD PRESSURE: 50 MMHG | RESPIRATION RATE: 16 BRPM | HEIGHT: 63 IN | HEART RATE: 81 BPM | SYSTOLIC BLOOD PRESSURE: 116 MMHG

## 2017-12-03 DIAGNOSIS — R11.0 NAUSEA: ICD-10-CM

## 2017-12-03 DIAGNOSIS — R10.13 EPIGASTRIC ABDOMINAL PAIN: Primary | ICD-10-CM

## 2017-12-03 LAB
ALBUMIN SERPL-MCNC: 4.1 G/DL (ref 3.5–5.2)
ALBUMIN/GLOB SERPL: 1.2 G/DL
ALP SERPL-CCNC: 214 U/L (ref 39–117)
ALT SERPL W P-5'-P-CCNC: 19 U/L (ref 1–33)
ANION GAP SERPL CALCULATED.3IONS-SCNC: 13 MMOL/L
AST SERPL-CCNC: 18 U/L (ref 1–32)
BACTERIA UR QL AUTO: ABNORMAL /HPF
BASOPHILS # BLD AUTO: 0.03 10*3/MM3 (ref 0–0.2)
BASOPHILS NFR BLD AUTO: 0.5 % (ref 0–1.5)
BILIRUB SERPL-MCNC: 0.2 MG/DL (ref 0.1–1.2)
BILIRUB UR QL STRIP: NEGATIVE
BUN BLD-MCNC: 11 MG/DL (ref 8–23)
BUN/CREAT SERPL: 17.7 (ref 7–25)
CALCIUM SPEC-SCNC: 9.6 MG/DL (ref 8.6–10.5)
CHLORIDE SERPL-SCNC: 104 MMOL/L (ref 98–107)
CLARITY UR: CLEAR
CO2 SERPL-SCNC: 25 MMOL/L (ref 22–29)
COLOR UR: YELLOW
CREAT BLD-MCNC: 0.62 MG/DL (ref 0.57–1)
DEPRECATED RDW RBC AUTO: 42.6 FL (ref 37–54)
EOSINOPHIL # BLD AUTO: 0.09 10*3/MM3 (ref 0–0.7)
EOSINOPHIL NFR BLD AUTO: 1.4 % (ref 0.3–6.2)
ERYTHROCYTE [DISTWIDTH] IN BLOOD BY AUTOMATED COUNT: 14 % (ref 11.7–13)
GFR SERPL CREATININE-BSD FRML MDRD: 96 ML/MIN/1.73
GLOBULIN UR ELPH-MCNC: 3.4 GM/DL
GLUCOSE BLD-MCNC: 97 MG/DL (ref 65–99)
GLUCOSE UR STRIP-MCNC: NEGATIVE MG/DL
HCT VFR BLD AUTO: 35.6 % (ref 35.6–45.5)
HGB BLD-MCNC: 11.8 G/DL (ref 11.9–15.5)
HGB UR QL STRIP.AUTO: ABNORMAL
HYALINE CASTS UR QL AUTO: ABNORMAL /LPF
IMM GRANULOCYTES # BLD: 0 10*3/MM3 (ref 0–0.03)
IMM GRANULOCYTES NFR BLD: 0 % (ref 0–0.5)
KETONES UR QL STRIP: NEGATIVE
LEUKOCYTE ESTERASE UR QL STRIP.AUTO: ABNORMAL
LIPASE SERPL-CCNC: 43 U/L (ref 13–60)
LYMPHOCYTES # BLD AUTO: 1.69 10*3/MM3 (ref 0.9–4.8)
LYMPHOCYTES NFR BLD AUTO: 26.2 % (ref 19.6–45.3)
MCH RBC QN AUTO: 28 PG (ref 26.9–32)
MCHC RBC AUTO-ENTMCNC: 33.1 G/DL (ref 32.4–36.3)
MCV RBC AUTO: 84.6 FL (ref 80.5–98.2)
MONOCYTES # BLD AUTO: 0.55 10*3/MM3 (ref 0.2–1.2)
MONOCYTES NFR BLD AUTO: 8.5 % (ref 5–12)
NEUTROPHILS # BLD AUTO: 4.08 10*3/MM3 (ref 1.9–8.1)
NEUTROPHILS NFR BLD AUTO: 63.4 % (ref 42.7–76)
NITRITE UR QL STRIP: NEGATIVE
PH UR STRIP.AUTO: 7 [PH] (ref 5–8)
PLATELET # BLD AUTO: 243 10*3/MM3 (ref 140–500)
PMV BLD AUTO: 10.6 FL (ref 6–12)
POTASSIUM BLD-SCNC: 3.7 MMOL/L (ref 3.5–5.2)
PROT SERPL-MCNC: 7.5 G/DL (ref 6–8.5)
PROT UR QL STRIP: NEGATIVE
RBC # BLD AUTO: 4.21 10*6/MM3 (ref 3.9–5.2)
RBC # UR: ABNORMAL /HPF
REF LAB TEST METHOD: ABNORMAL
SODIUM BLD-SCNC: 142 MMOL/L (ref 136–145)
SP GR UR STRIP: 1.01 (ref 1–1.03)
SQUAMOUS #/AREA URNS HPF: ABNORMAL /HPF
TROPONIN T SERPL-MCNC: <0.01 NG/ML (ref 0–0.03)
UROBILINOGEN UR QL STRIP: ABNORMAL
WBC NRBC COR # BLD: 6.44 10*3/MM3 (ref 4.5–10.7)
WBC UR QL AUTO: ABNORMAL /HPF

## 2017-12-03 PROCEDURE — 84484 ASSAY OF TROPONIN QUANT: CPT | Performed by: PHYSICIAN ASSISTANT

## 2017-12-03 PROCEDURE — 93005 ELECTROCARDIOGRAM TRACING: CPT | Performed by: PHYSICIAN ASSISTANT

## 2017-12-03 PROCEDURE — 83690 ASSAY OF LIPASE: CPT | Performed by: PHYSICIAN ASSISTANT

## 2017-12-03 PROCEDURE — 84075 ASSAY ALKALINE PHOSPHATASE: CPT | Performed by: PHYSICIAN ASSISTANT

## 2017-12-03 PROCEDURE — 80053 COMPREHEN METABOLIC PANEL: CPT | Performed by: PHYSICIAN ASSISTANT

## 2017-12-03 PROCEDURE — 96374 THER/PROPH/DIAG INJ IV PUSH: CPT

## 2017-12-03 PROCEDURE — 93010 ELECTROCARDIOGRAM REPORT: CPT | Performed by: INTERNAL MEDICINE

## 2017-12-03 PROCEDURE — 85025 COMPLETE CBC W/AUTO DIFF WBC: CPT | Performed by: PHYSICIAN ASSISTANT

## 2017-12-03 PROCEDURE — 0 IOPAMIDOL 61 % SOLUTION: Performed by: FAMILY MEDICINE

## 2017-12-03 PROCEDURE — 99284 EMERGENCY DEPT VISIT MOD MDM: CPT

## 2017-12-03 PROCEDURE — 74177 CT ABD & PELVIS W/CONTRAST: CPT

## 2017-12-03 PROCEDURE — 84080 ASSAY ALKALINE PHOSPHATASES: CPT | Performed by: PHYSICIAN ASSISTANT

## 2017-12-03 PROCEDURE — 25010000002 ONDANSETRON PER 1 MG: Performed by: PHYSICIAN ASSISTANT

## 2017-12-03 PROCEDURE — 81001 URINALYSIS AUTO W/SCOPE: CPT | Performed by: PHYSICIAN ASSISTANT

## 2017-12-03 RX ORDER — PANTOPRAZOLE SODIUM 20 MG/1
20 TABLET, DELAYED RELEASE ORAL DAILY
Qty: 15 TABLET | Refills: 0 | Status: SHIPPED | OUTPATIENT
Start: 2017-12-03 | End: 2018-05-29 | Stop reason: SDUPTHER

## 2017-12-03 RX ORDER — ONDANSETRON 2 MG/ML
4 INJECTION INTRAMUSCULAR; INTRAVENOUS ONCE
Status: COMPLETED | OUTPATIENT
Start: 2017-12-03 | End: 2017-12-03

## 2017-12-03 RX ORDER — ONDANSETRON 4 MG/1
4 TABLET, ORALLY DISINTEGRATING ORAL EVERY 4 HOURS
Qty: 15 TABLET | Refills: 0 | Status: SHIPPED | OUTPATIENT
Start: 2017-12-03 | End: 2018-05-29

## 2017-12-03 RX ADMIN — ONDANSETRON 4 MG: 2 INJECTION INTRAMUSCULAR; INTRAVENOUS at 09:00

## 2017-12-03 RX ADMIN — IOPAMIDOL 85 ML: 612 INJECTION, SOLUTION INTRAVENOUS at 10:42

## 2017-12-03 NOTE — DISCHARGE INSTRUCTIONS
Recheck with primay care provider in 2 days.  Take the medications as prescribed.  Return to the ER for increasing pain, fever, intractable vomiting, any concerns.

## 2017-12-03 NOTE — ED TRIAGE NOTES
Pt c/o upper abd pain wrapping around left side since before Thanksgiving, states saw Dr. Díaz on Monday and he believed pt's thyroid was causing the pain and Rx thyroid med, denies improvement in symptoms

## 2017-12-03 NOTE — ED PROVIDER NOTES
The patient presents complaining of central epigastric abd pain starting 1.5 weeks ago with radiation to her back and worsens with eating. Pt also c/o nausea and posterior dull neck pain. Pt denies vomiting, diarrhea, or bloody stool. Pt denies a Hx of ulcers or gastritis. Pt states a Hx of cholecystectomy.     Physical Exam:   Abdomen: tender epigastrium without guarding or rebound.     EKG           EKG time: 0824  Rhythm/Rate: sinus rhythm 73  P waves and AK: normal   QRS, axis: normal    ST and T waves: normal      Interpreted Contemporaneously by me, independently viewed  unchanged compared to prior 12/20/15    Plan to CT the pt to assess for pancreatitis.     I supervised care provided by the midlevel provider.  We have discussed this patient's history, physical exam, and treatment plan.  I have reviewed the note and personally saw and examined the patient and agree with the plan of care.    Documentation assistance provided by patrick Morel for Dr. Fan.  Information recorded by the scribe was done at my direction and has been verified and validated by me.           Peterson Morel  12/03/17 0531       Brady Fan MD  12/03/17 [id number]

## 2017-12-03 NOTE — ED PROVIDER NOTES
"EMERGENCY DEPARTMENT ENCOUNTER    Room Number:  05/05  Date seen:  12/3/2017  Time seen: 7:52 AM  PCP: No primary care provider on file.    HPI:  Chief complaint:abdominal pain  Context:Laverne Polk is a 67 y.o. female who presents to the ED with c/o epigastric abdominal pain which radiates to her lower abdomen and began about 14 days ago. The pt states that her pain is constant but is worsened with eating. She describes her pain as dull and currently rates her pain as a 5/10. She states that her worst pain is a 7/10. The pt also c/o mild headache and nausea. The pt has had her gall bladder removed. The pt has a history of hypothyroidism. She has recently been seen by her PCP, who adjusted her thyroid medication. The pt states that she rarely drinks EtOH and has not had any EtOH since the onset of her symptoms.     Onset: gradual  Location:upper abdomen  Radiation: lower abdomen and to back  Duration: about 14 days  Timing: constant  Character: \"dull, nauseated\"  Aggravating Factors: eating  Alleviating Factors: none  Severity: moderate    ALLERGIES  Demerol [meperidine]    PAST MEDICAL HISTORY  Active Ambulatory Problems     Diagnosis Date Noted   • Depression 12/06/2016   • Hyperthyroidism 12/06/2016   • AR (allergic rhinitis) 12/06/2016   • Acquired complex renal cyst 11/27/2017     Resolved Ambulatory Problems     Diagnosis Date Noted   • No Resolved Ambulatory Problems     Past Medical History:   Diagnosis Date   • Disease of thyroid gland        PAST SURGICAL HISTORY  Past Surgical History:   Procedure Laterality Date   • CHOLECYSTECTOMY      2 years ago       FAMILY HISTORY  Family History   Problem Relation Age of Onset   • No Known Problems Sister    • No Known Problems Brother    • No Known Problems Daughter    • No Known Problems Son    • No Known Problems Sister        SOCIAL HISTORY  Social History     Social History   • Marital status:      Spouse name: N/A   • Number of children: N/A   • " Years of education: N/A     Occupational History   • Not on file.     Social History Main Topics   • Smoking status: Never Smoker   • Smokeless tobacco: Never Used   • Alcohol use Yes      Comment: Wine: Occas   • Drug use: Not on file   • Sexual activity: Not on file     Other Topics Concern   • Not on file     Social History Narrative   • No narrative on file       REVIEW OF SYSTEMS  Review of Systems   Constitutional: Negative for chills and fever.   Eyes: Negative.    Respiratory: Negative for shortness of breath.    Cardiovascular: Negative for chest pain.   Gastrointestinal: Positive for abdominal pain and nausea. Negative for diarrhea and vomiting.   Genitourinary: Negative.  Negative for dysuria and hematuria.   Musculoskeletal: Negative.    Skin: Negative.    Neurological: Positive for headaches.   Psychiatric/Behavioral: Negative.        PHYSICAL EXAM  ED Triage Vitals   Temp Heart Rate Resp BP SpO2   12/03/17 0725 12/03/17 0725 12/03/17 0725 -- 12/03/17 0725   96.3 °F (35.7 °C) 86 16  100 %      Temp src Heart Rate Source Patient Position BP Location FiO2 (%)   12/03/17 0725 12/03/17 0725 -- -- --   Tympanic Monitor        Physical Exam   Constitutional: She is oriented to person, place, and time and well-developed, well-nourished, and in no distress. No distress.   HENT:   Head: Normocephalic and atraumatic.   Right Ear: External ear normal.   Left Ear: External ear normal.   Nose: Nose normal.   Eyes: Conjunctivae are normal.   Neck: Normal range of motion.   Cardiovascular: Normal rate and regular rhythm.    Pulmonary/Chest: Effort normal and breath sounds normal.   Abdominal:   hyperactive bowel sounds  Soft  Nontender  Nondistended  No rebound, guarding, or rigidity  No appreciable organomegaly  No CVA tenderness     Musculoskeletal: Normal range of motion.   Neurological: She is alert and oriented to person, place, and time.   Skin: Skin is warm and dry.   Psychiatric: Affect normal.   Nursing note  and vitals reviewed.      LAB RESULTS  Recent Results (from the past 24 hour(s))   Urinalysis With / Culture If Indicated - Urine, Clean Catch    Collection Time: 12/03/17  8:42 AM   Result Value Ref Range    Color, UA Yellow Yellow, Straw    Appearance, UA Clear Clear    pH, UA 7.0 5.0 - 8.0    Specific Gravity, UA 1.014 1.005 - 1.030    Glucose, UA Negative Negative    Ketones, UA Negative Negative    Bilirubin, UA Negative Negative    Blood, UA Trace (A) Negative    Protein, UA Negative Negative    Leuk Esterase, UA Trace (A) Negative    Nitrite, UA Negative Negative    Urobilinogen, UA 0.2 E.U./dL 0.2 - 1.0 E.U./dL   Urinalysis, Microscopic Only - Urine, Clean Catch    Collection Time: 12/03/17  8:42 AM   Result Value Ref Range    RBC, UA 3-5 (A) None Seen, 0-2 /HPF    WBC, UA 3-5 (A) None Seen, 0-2 /HPF    Bacteria, UA None Seen None Seen /HPF    Squamous Epithelial Cells, UA 0-2 None Seen, 0-2 /HPF    Hyaline Casts, UA None Seen None Seen /LPF    Methodology Automated Microscopy    Comprehensive Metabolic Panel    Collection Time: 12/03/17  8:57 AM   Result Value Ref Range    Glucose 97 65 - 99 mg/dL    BUN 11 8 - 23 mg/dL    Creatinine 0.62 0.57 - 1.00 mg/dL    Sodium 142 136 - 145 mmol/L    Potassium 3.7 3.5 - 5.2 mmol/L    Chloride 104 98 - 107 mmol/L    CO2 25.0 22.0 - 29.0 mmol/L    Calcium 9.6 8.6 - 10.5 mg/dL    Total Protein 7.5 6.0 - 8.5 g/dL    Albumin 4.10 3.50 - 5.20 g/dL    ALT (SGPT) 19 1 - 33 U/L    AST (SGOT) 18 1 - 32 U/L    Alkaline Phosphatase 214 (H) 39 - 117 U/L    Total Bilirubin 0.2 0.1 - 1.2 mg/dL    eGFR Non African Amer 96 >60 mL/min/1.73    Globulin 3.4 gm/dL    A/G Ratio 1.2 g/dL    BUN/Creatinine Ratio 17.7 7.0 - 25.0    Anion Gap 13.0 mmol/L   Lipase    Collection Time: 12/03/17  8:57 AM   Result Value Ref Range    Lipase 43 13 - 60 U/L   CBC Auto Differential    Collection Time: 12/03/17  8:57 AM   Result Value Ref Range    WBC 6.44 4.50 - 10.70 10*3/mm3    RBC 4.21 3.90 - 5.20  10*6/mm3    Hemoglobin 11.8 (L) 11.9 - 15.5 g/dL    Hematocrit 35.6 35.6 - 45.5 %    MCV 84.6 80.5 - 98.2 fL    MCH 28.0 26.9 - 32.0 pg    MCHC 33.1 32.4 - 36.3 g/dL    RDW 14.0 (H) 11.7 - 13.0 %    RDW-SD 42.6 37.0 - 54.0 fl    MPV 10.6 6.0 - 12.0 fL    Platelets 243 140 - 500 10*3/mm3    Neutrophil % 63.4 42.7 - 76.0 %    Lymphocyte % 26.2 19.6 - 45.3 %    Monocyte % 8.5 5.0 - 12.0 %    Eosinophil % 1.4 0.3 - 6.2 %    Basophil % 0.5 0.0 - 1.5 %    Immature Grans % 0.0 0.0 - 0.5 %    Neutrophils, Absolute 4.08 1.90 - 8.10 10*3/mm3    Lymphocytes, Absolute 1.69 0.90 - 4.80 10*3/mm3    Monocytes, Absolute 0.55 0.20 - 1.20 10*3/mm3    Eosinophils, Absolute 0.09 0.00 - 0.70 10*3/mm3    Basophils, Absolute 0.03 0.00 - 0.20 10*3/mm3    Immature Grans, Absolute 0.00 0.00 - 0.03 10*3/mm3   Troponin    Collection Time: 12/03/17  8:57 AM   Result Value Ref Range    Troponin T <0.010 0.000 - 0.030 ng/mL       I ordered the above labs and reviewed the results    RADIOLOGY  CT Abdomen Pelvis With Contrast           CT abd/pelvis: no acute findings.     I ordered the above noted radiological studies and reviewed the images on the PACS system.  Spoke with Dr. Mallory (radiology) regarding CT/MRI scan results    MEDICATIONS GIVEN IN ER  Medications   ondansetron (ZOFRAN) injection 4 mg (4 mg Intravenous Given 12/3/17 0900)   iopamidol (ISOVUE-300) 61 % injection 100 mL (85 mL Intravenous Given 12/3/17 1042)       EKG  Interpreted by ED Physician Dr. Fan remove if no ekg    PROCEDURES  Procedures      PROGRESS AND CONSULTS    Progress Notes:    ED Course     0819: Ordered labs and EKG for further evaluation. Ordered zofran for nausea.     0910: Reviewed pt's history and workup with Dr. Fan.  After a bedside evaluation; Dr Fan agrees with the plan of care    0934: Ordered CT abd/pelvis for further evaluation.     1128: Discussed pt's case with Dr. Fan. Discussed elevated alkaline phosphate level. He requests a  "fractionated alk phosphatase and a troponin for further evaluation.    1132: Rechecked pt, she was resting comfortably. She states that she is feeling much better after receiving IV zofran. Discussed CT abd/pelvis which showed nothing acute. Discussed lab results which were normal, with the exception of the alkaline phosphatase. Discussed plan to perform a troponin lab and a fractionated alk phosphatase. Discussed plan to discharge pt with nausea medication and acid blockers. The pt should f/o with her PCP within the next week. The pt agrees with and understands plan to discharge. All questions were addressed at this time.     1143: Ordered alkaline phosphatase, isoenzymes and troponin for further evaluation.     Disposition vitals:  /50  Pulse 81  Temp 96.3 °F (35.7 °C) (Tympanic)   Resp 16  Ht 63\" (160 cm)  Wt 136 lb (61.7 kg)  SpO2 99%  BMI 24.09 kg/m2      DIAGNOSIS  Final diagnoses:   Epigastric abdominal pain   Nausea       FOLLOW UP   Berry Díaz MD  [address] [address]  [phone]    Schedule an appointment as soon as possible for a visit in 2 days        RX     Medication List      New Prescriptions          ondansetron ODT 4 MG disintegrating tablet   Commonly known as:  ZOFRAN-ODT   Take 1 tablet by mouth Every 4 (Four) Hours.       pantoprazole 20 MG EC tablet   Commonly known as:  PROTONIX   Take 1 tablet by mouth Daily.             Documentation assistance provided by patrick Rodriges for Donna Quesada PA-C.  Information recorded by the scribe was done at my direction and has been verified and validated by me.                Erica Rodriges  12/03/17 1207      1215: ADDENDUM: Troponin negative.     KELSEY Singleton  12/03/17 1550    "

## 2017-12-06 LAB
ALP BONE CFR SERPL: 52 % (ref 14–68)
ALP INTEST CFR SERPL: 20 % (ref 0–18)
ALP LIVER CFR SERPL: 27 % (ref 18–85)
ALP SERPL-CCNC: 195 IU/L (ref 39–117)

## 2017-12-28 ENCOUNTER — OFFICE VISIT (OUTPATIENT)
Dept: INTERNAL MEDICINE | Facility: CLINIC | Age: 67
End: 2017-12-28

## 2017-12-28 VITALS
DIASTOLIC BLOOD PRESSURE: 68 MMHG | HEIGHT: 63 IN | TEMPERATURE: 98 F | RESPIRATION RATE: 16 BRPM | SYSTOLIC BLOOD PRESSURE: 128 MMHG | BODY MASS INDEX: 24.03 KG/M2 | HEART RATE: 78 BPM | WEIGHT: 135.6 LBS | OXYGEN SATURATION: 98 %

## 2017-12-28 DIAGNOSIS — R68.89 FLU-LIKE SYMPTOMS: Primary | ICD-10-CM

## 2017-12-28 PROCEDURE — 99213 OFFICE O/P EST LOW 20 MIN: CPT | Performed by: INTERNAL MEDICINE

## 2017-12-28 RX ORDER — ACETAMINOPHEN 500 MG
500 TABLET ORAL EVERY 6 HOURS PRN
COMMUNITY
End: 2018-05-29

## 2017-12-28 RX ORDER — BENZONATATE 200 MG/1
200 CAPSULE ORAL 3 TIMES DAILY PRN
Qty: 30 CAPSULE | Refills: 0 | Status: SHIPPED | OUTPATIENT
Start: 2017-12-28 | End: 2018-09-28

## 2017-12-28 NOTE — PROGRESS NOTES
Subjective   Laverne Polk is a 67 y.o. female.     Chief Complaint   Patient presents with   • Cough     x 6 days, in bed for 5 days   • URI     took mucinex, but did not help   • Diarrhea     loss of appetite         Cough   This is a new problem. The current episode started in the past 7 days. The problem has been unchanged. The problem occurs hourly. The cough is non-productive. Associated symptoms include chills, a fever, headaches, myalgias and shortness of breath. Pertinent negatives include no nasal congestion or postnasal drip. Nothing aggravates the symptoms. She has tried nothing for the symptoms.        The following portions of the patient's history were reviewed and updated as appropriate: allergies, current medications, past social history and problem list.    Outpatient Prescriptions Marked as Taking for the 12/28/17 encounter (Office Visit) with Berry Díaz MD   Medication Sig Dispense Refill   • acetaminophen (TYLENOL) 500 MG tablet Take 500 mg by mouth Every 6 (Six) Hours As Needed for Mild Pain .     • citalopram (CeleXA) 20 MG tablet Take 1 tablet by mouth Daily. 90 tablet 1   • LORazepam (ATIVAN) 0.5 MG tablet TAKE 1 TABLET BY MOUTH AT BEDTIME AS NEEDED FOR ANXIETY 30 tablet 1   • methIMAzole (TAPAZOLE) 10 MG tablet Take 1 tablet by mouth Daily. 90 tablet 1   • ondansetron ODT (ZOFRAN-ODT) 4 MG disintegrating tablet Take 1 tablet by mouth Every 4 (Four) Hours. 15 tablet 0   • pantoprazole (PROTONIX) 20 MG EC tablet Take 1 tablet by mouth Daily. 15 tablet 0       Review of Systems   Constitutional: Positive for chills and fever.   HENT: Negative for postnasal drip.    Respiratory: Positive for shortness of breath.    Gastrointestinal: Positive for diarrhea.   Musculoskeletal: Positive for myalgias.   Neurological: Positive for headaches.       Objective   Vitals:    12/28/17 1011   BP: 128/68   Pulse: 78   Resp: 16   Temp: 98 °F (36.7 °C)   SpO2: 98%      Last Weight    12/28/17  1011    Weight: 61.5 kg (135 lb 9.6 oz)    [unfilled]  Body mass index is 24.03 kg/(m^2).      Physical Exam   Constitutional: She appears well-developed and well-nourished.   HENT:   Head: Normocephalic and atraumatic.   Right Ear: External ear normal.   Left Ear: External ear normal.   Nose: Nose normal.   Mouth/Throat: Oropharynx is clear and moist.   Eyes: Conjunctivae are normal. Pupils are equal, round, and reactive to light.   Pulmonary/Chest: Effort normal and breath sounds normal. No respiratory distress. She has no wheezes. She has no rales.   Skin: Skin is warm and dry.         Problem List Items Addressed This Visit     None      Visit Diagnoses     Flu-like symptoms    -  Primary        Assessment/Plan   In with 6 day illness.  Headache and body aches.  Fever and chills.  Cough.  This is a flulike illness.  Sounds like influenza.  She did take her flu shot this year.  For now will treat symptomatically.  2 late to treat with medication.  Fluids.  Tylenol.  Tessalon cough perles.         Dragon disclaimer:   Much of this encounter note is an electronic transcription/translation of spoken language to printed text. The electronic translation of spoken language may permit erroneous, or at times, nonsensical words or phrases to be inadvertently transcribed; Although I have reviewed the note for such errors, some may still exist.

## 2018-01-22 RX ORDER — LORAZEPAM 0.5 MG/1
TABLET ORAL
Qty: 30 TABLET | Refills: 1 | OUTPATIENT
Start: 2018-01-22 | End: 2018-12-12 | Stop reason: SDUPTHER

## 2018-01-22 RX ORDER — CITALOPRAM 20 MG/1
TABLET ORAL
Qty: 90 TABLET | Refills: 1 | Status: SHIPPED | OUTPATIENT
Start: 2018-01-22 | End: 2018-09-15 | Stop reason: SDUPTHER

## 2018-03-07 ENCOUNTER — APPOINTMENT (OUTPATIENT)
Dept: WOMENS IMAGING | Facility: HOSPITAL | Age: 68
End: 2018-03-07

## 2018-03-07 PROCEDURE — 77067 SCR MAMMO BI INCL CAD: CPT | Performed by: RADIOLOGY

## 2018-03-07 PROCEDURE — 77063 BREAST TOMOSYNTHESIS BI: CPT | Performed by: RADIOLOGY

## 2018-05-29 ENCOUNTER — RESULTS ENCOUNTER (OUTPATIENT)
Dept: INTERNAL MEDICINE | Facility: CLINIC | Age: 68
End: 2018-05-29

## 2018-05-29 ENCOUNTER — OFFICE VISIT (OUTPATIENT)
Dept: INTERNAL MEDICINE | Facility: CLINIC | Age: 68
End: 2018-05-29

## 2018-05-29 VITALS
OXYGEN SATURATION: 93 % | DIASTOLIC BLOOD PRESSURE: 68 MMHG | BODY MASS INDEX: 23.99 KG/M2 | WEIGHT: 135.4 LBS | RESPIRATION RATE: 16 BRPM | HEIGHT: 63 IN | HEART RATE: 84 BPM | SYSTOLIC BLOOD PRESSURE: 116 MMHG

## 2018-05-29 DIAGNOSIS — E05.90 HYPERTHYROIDISM: ICD-10-CM

## 2018-05-29 DIAGNOSIS — Z12.11 SCREEN FOR COLON CANCER: ICD-10-CM

## 2018-05-29 DIAGNOSIS — K21.9 GASTROESOPHAGEAL REFLUX DISEASE, ESOPHAGITIS PRESENCE NOT SPECIFIED: ICD-10-CM

## 2018-05-29 DIAGNOSIS — S16.1XXA STRAIN OF NECK MUSCLE, INITIAL ENCOUNTER: Primary | ICD-10-CM

## 2018-05-29 PROCEDURE — 99213 OFFICE O/P EST LOW 20 MIN: CPT | Performed by: INTERNAL MEDICINE

## 2018-05-29 RX ORDER — PANTOPRAZOLE SODIUM 20 MG/1
20 TABLET, DELAYED RELEASE ORAL DAILY
Qty: 15 TABLET | Refills: 0 | Status: SHIPPED | OUTPATIENT
Start: 2018-05-29 | End: 2018-07-15 | Stop reason: SDUPTHER

## 2018-05-29 RX ORDER — ONDANSETRON 4 MG/1
4 TABLET, FILM COATED ORAL 4 TIMES DAILY PRN
Qty: 15 TABLET | Refills: 1 | Status: SHIPPED | OUTPATIENT
Start: 2018-05-29 | End: 2019-06-20 | Stop reason: SDUPTHER

## 2018-05-29 RX ORDER — PANTOPRAZOLE SODIUM 20 MG/1
20 TABLET, DELAYED RELEASE ORAL DAILY
Qty: 15 TABLET | Refills: 0 | Status: SHIPPED | OUTPATIENT
Start: 2018-05-29 | End: 2018-05-29 | Stop reason: SDUPTHER

## 2018-05-29 NOTE — PROGRESS NOTES
Subjective   Laverne Polk is a 68 y.o. female.     Chief Complaint   Patient presents with   • Neck Pain         Neck Pain    This is a new problem. The current episode started 1 to 4 weeks ago. The problem occurs constantly. The problem has been unchanged. The pain is associated with nothing. The pain is present in the occipital region. The pain is mild. The symptoms are aggravated by twisting and bending. Pertinent negatives include no fever, numbness, paresis, tingling or weakness. She has tried NSAIDs for the symptoms.        The following portions of the patient's history were reviewed and updated as appropriate: allergies, current medications, past social history and problem list.    Outpatient Prescriptions Marked as Taking for the 5/29/18 encounter (Office Visit) with Berry Díaz MD   Medication Sig Dispense Refill   • benzonatate (TESSALON) 200 MG capsule Take 1 capsule by mouth 3 (Three) Times a Day As Needed for Cough. 30 capsule 0   • LORazepam (ATIVAN) 0.5 MG tablet TAKE 1 TABLET BY MOUTH AT BEDTIME AS NEEDED 30 tablet 1   • methIMAzole (TAPAZOLE) 10 MG tablet Take 1 tablet by mouth Daily. 90 tablet 1   • ondansetron (ZOFRAN) 4 MG tablet Take 1 tablet by mouth 4 (Four) Times a Day As Needed for Nausea or Vomiting. 15 tablet 1   • pantoprazole (PROTONIX) 20 MG EC tablet Take 1 tablet by mouth Daily. 15 tablet 0   • [DISCONTINUED] acetaminophen (TYLENOL) 500 MG tablet Take 500 mg by mouth Every 6 (Six) Hours As Needed for Mild Pain .     • [DISCONTINUED] pantoprazole (PROTONIX) 20 MG EC tablet Take 1 tablet by mouth Daily. 15 tablet 0       Review of Systems   Constitutional: Negative for chills and fever.   Gastrointestinal: Positive for abdominal pain.   Musculoskeletal: Positive for neck pain.   Neurological: Negative for tingling, weakness and numbness.       Objective   Vitals:    05/29/18 1003   BP: 116/68   Pulse: 84   Resp: 16   SpO2: 93%      1    05/29/18  1003   Weight: 61.4 kg (135 lb  6.4 oz)    [unfilled]  Body mass index is 23.99 kg/m².      Physical Exam   Constitutional: She is oriented to person, place, and time. She appears well-developed and well-nourished.   HENT:   Head: Normocephalic and atraumatic.   Neurological: She is alert and oriented to person, place, and time. She has normal strength and normal reflexes. She exhibits normal muscle tone.         Problem List Items Addressed This Visit        Endocrine    Hyperthyroidism    Relevant Orders    TSH    T3, Free    T4, Free      Other Visit Diagnoses     Strain of neck muscle, initial encounter    -  Primary    Gastroesophageal reflux disease, esophagitis presence not specified        Relevant Medications    pantoprazole (PROTONIX) 20 MG EC tablet    Screen for colon cancer        Relevant Orders    Cologuard - Stool, Per Rectum        Assessment/Plan   In with neck pain for 2 weeks or more now.  It seems be posterior cervical pain.  Worse when she bends or twist.  No apparent injury.  Last 2 days she's developed some pain in the midepigastrium.  She's only taken an occasional Aleve for the pain.  She was in the emergency room within the last year with upper abdominal pain and given a combination of Protonix and Zofran which cleared her up.  Very stressed at work now.  May be playing a role with both sets of symptoms.  Suggested she use heat for her neck.  Go ahead and take the Protonix for the abdominal pain.  Tylenol when necessary.  She's due for Prevnar today.  We'll check TSH, free T3, and free T4 today.  She also like to proceed with colorectal cancer screening with a cologuard.  Planning on getting hep A immunizations.  Currently taking 5 mg of methimazole once daily.           .bmifollowup  Dragon disclaimer:   Much of this encounter note is an electronic transcription/translation of spoken language to printed text. The electronic translation of spoken language may permit erroneous, or at times, nonsensical words or  phrases to be inadvertently transcribed; Although I have reviewed the note for such errors, some may still exist.

## 2018-05-30 LAB
T3FREE SERPL-MCNC: 4.2 PG/ML (ref 2–4.4)
T4 FREE SERPL-MCNC: 1.27 NG/DL (ref 0.93–1.7)
TSH SERPL DL<=0.005 MIU/L-ACNC: <0.005 MIU/ML (ref 0.27–4.2)

## 2018-06-05 ENCOUNTER — TELEPHONE (OUTPATIENT)
Dept: INTERNAL MEDICINE | Facility: CLINIC | Age: 68
End: 2018-06-05

## 2018-06-05 NOTE — TELEPHONE ENCOUNTER
[employer] Approval:  No PA required.  Responsible for $183 deductible and $20 copay.  Medicare will cover 80%  [employer] will cover 20%  Spoke to Christiano hanson Ref # [id number]

## 2018-07-16 RX ORDER — PANTOPRAZOLE SODIUM 20 MG/1
TABLET, DELAYED RELEASE ORAL
Qty: 15 TABLET | Refills: 0 | Status: SHIPPED | OUTPATIENT
Start: 2018-07-16 | End: 2018-10-30 | Stop reason: SDUPTHER

## 2018-09-10 RX ORDER — METHIMAZOLE 10 MG/1
10 TABLET ORAL DAILY
Qty: 90 TABLET | Refills: 0 | OUTPATIENT
Start: 2018-09-10

## 2018-09-17 ENCOUNTER — TELEPHONE (OUTPATIENT)
Dept: INTERNAL MEDICINE | Facility: CLINIC | Age: 68
End: 2018-09-17

## 2018-09-17 RX ORDER — METHIMAZOLE 10 MG/1
10 TABLET ORAL DAILY
Qty: 90 TABLET | Refills: 1 | Status: SHIPPED | OUTPATIENT
Start: 2018-09-17 | End: 2019-06-20 | Stop reason: SDUPTHER

## 2018-09-17 RX ORDER — METHIMAZOLE 10 MG/1
10 TABLET ORAL DAILY
Qty: 90 TABLET | Refills: 0 | Status: SHIPPED | OUTPATIENT
Start: 2018-09-17 | End: 2018-09-17 | Stop reason: SDUPTHER

## 2018-09-17 RX ORDER — CITALOPRAM 20 MG/1
TABLET ORAL
Qty: 90 TABLET | Refills: 1 | Status: SHIPPED | OUTPATIENT
Start: 2018-09-17 | End: 2019-06-20 | Stop reason: SDUPTHER

## 2018-09-17 NOTE — TELEPHONE ENCOUNTER
Medication, Strength, & Sig: methlmazole 10 mg Take 1 tablet daily  Last O.V.: 5/29/18  Next O.V.: 9/28/18  Verify Pharmacy: [facility]      Patient scheduled appt

## 2018-09-28 ENCOUNTER — OFFICE VISIT (OUTPATIENT)
Dept: INTERNAL MEDICINE | Facility: CLINIC | Age: 68
End: 2018-09-28

## 2018-09-28 VITALS
DIASTOLIC BLOOD PRESSURE: 64 MMHG | OXYGEN SATURATION: 97 % | SYSTOLIC BLOOD PRESSURE: 120 MMHG | TEMPERATURE: 98.1 F | BODY MASS INDEX: 24.8 KG/M2 | HEART RATE: 72 BPM | HEIGHT: 63 IN | WEIGHT: 140 LBS | RESPIRATION RATE: 16 BRPM

## 2018-09-28 DIAGNOSIS — J30.1 SEASONAL ALLERGIC RHINITIS DUE TO POLLEN, UNSPECIFIED CHRONICITY: ICD-10-CM

## 2018-09-28 DIAGNOSIS — Z23 NEED FOR VACCINATION: ICD-10-CM

## 2018-09-28 DIAGNOSIS — F32.9 REACTIVE DEPRESSION: ICD-10-CM

## 2018-09-28 DIAGNOSIS — E05.90 HYPERTHYROIDISM: Primary | ICD-10-CM

## 2018-09-28 PROCEDURE — 99213 OFFICE O/P EST LOW 20 MIN: CPT | Performed by: INTERNAL MEDICINE

## 2018-09-28 PROCEDURE — G0009 ADMIN PNEUMOCOCCAL VACCINE: HCPCS | Performed by: INTERNAL MEDICINE

## 2018-09-28 PROCEDURE — G0008 ADMIN INFLUENZA VIRUS VAC: HCPCS | Performed by: INTERNAL MEDICINE

## 2018-09-28 PROCEDURE — 90670 PCV13 VACCINE IM: CPT | Performed by: INTERNAL MEDICINE

## 2018-09-28 PROCEDURE — 90674 CCIIV4 VAC NO PRSV 0.5 ML IM: CPT | Performed by: INTERNAL MEDICINE

## 2018-09-28 NOTE — PROGRESS NOTES
Subjective   Laverne Polk is a 68 y.o. female.     Chief Complaint   Patient presents with   • Anxiety   • Hyperthyroidism         Anxiety   Presents for follow-up visit. Symptoms include nervous/anxious behavior. Symptoms occur occasionally. The quality of sleep is fair.     Her past medical history is significant for hyperthyroidism.   Hyperthyroidism   This is a chronic problem. The current episode started more than 1 year ago. The problem occurs constantly. The problem has been unchanged. Pertinent negatives include no chills or fever. Nothing aggravates the symptoms.   Thyroid Problem   Presents for follow-up visit. Symptoms include anxiety and weight gain. Patient reports no cold intolerance, hair loss, heat intolerance, tremors or weight loss. The symptoms have been worsening.        The following portions of the patient's history were reviewed and updated as appropriate: allergies, current medications, past social history and problem list.    Outpatient Prescriptions Marked as Taking for the 9/28/18 encounter (Office Visit) with Berry Daíz MD   Medication Sig Dispense Refill   • citalopram (CeleXA) 20 MG tablet TAKE 1 TABLET BY MOUTH DAILY. 90 tablet 1   • LORazepam (ATIVAN) 0.5 MG tablet TAKE 1 TABLET BY MOUTH AT BEDTIME AS NEEDED 30 tablet 1   • methIMAzole (TAPAZOLE) 10 MG tablet Take 1 tablet by mouth Daily. 90 tablet 1   • pantoprazole (PROTONIX) 20 MG EC tablet TAKE 1 TABLET BY MOUTH EVERY DAY 15 tablet 0       Review of Systems   Constitutional: Positive for weight gain. Negative for chills, fever and weight loss.   Endocrine: Negative for cold intolerance and heat intolerance.   Neurological: Negative for tremors.   Psychiatric/Behavioral: The patient is nervous/anxious. The patient is not hyperactive.        Objective   Vitals:    09/28/18 1526   BP: 120/64   Pulse: 72   Resp: 16   Temp: 98.1 °F (36.7 °C)   SpO2: 97%      1    09/28/18  1526   Weight: 63.5 kg (140 lb)    [unfilled]   Body mass index is 24.81 kg/m².      Physical Exam   Constitutional: She appears well-developed and well-nourished.   Cardiovascular: Normal rate, regular rhythm and normal heart sounds.  Exam reveals no friction rub.    No murmur heard.  Pulmonary/Chest: Effort normal and breath sounds normal. No respiratory distress. She has no wheezes. She has no rales.   Neurological: She has normal reflexes.         Problem List Items Addressed This Visit        Respiratory    AR (allergic rhinitis)       Endocrine    Hyperthyroidism - Primary       Other    Depression        Assessment/Plan   In today for recheck of hyperthyroidism.  Indigestion is about the same.  She been running hyperthyroid in the past but had gotten thyroid nearly completely controlled last time.  She had quit her methimazole but has been mostly back on it over the last 4 months.  Quit it for a few weeks..  Flu shot updated today.  Prevnar today.  She will have a free T3, free T4, and TSH today.  Annual lab work was done December 2017.  She has upcoming eye surgery on her left eyelid which is droopy.           Dragon disclaimer:   Much of this encounter note is an electronic transcription/translation of spoken language to printed text. The electronic translation of spoken language may permit erroneous, or at times, nonsensical words or phrases to be inadvertently transcribed; Although I have reviewed the note for such errors, some may still exist.

## 2018-09-28 NOTE — PATIENT INSTRUCTIONS
Influenza Virus Vaccine injection  What is this medicine?  INFLUENZA VIRUS VACCINE (in [facility] SE[facility]) helps to reduce the risk of getting influenza also known as the flu. The vaccine only helps protect you against some strains of the flu.  This medicine may be used for other purposes; ask your health care provider or pharmacist if you have questions.  COMMON BRAND NAME(S): Afluria, Agriflu, Alfuria, FLUAD, Fluarix, Fluarix Quadrivalent, Flublok, Flublok Quadrivalent, FLUCELVAX, Flulaval, Fluvirin, Fluzone, Fluzone High-Dose, Fluzone Intradermal  What should I tell my health care provider before I take this medicine?  They need to know if you have any of these conditions:  -bleeding disorder like hemophilia  -fever or infection  -Guillain-[place] syndrome or other neurological problems  -immune system problems  -infection with the human immunodeficiency virus (HIV) or AIDS  -low blood platelet counts  -multiple sclerosis  -an unusual or allergic reaction to influenza virus vaccine, latex, other medicines, foods, dyes, or preservatives. Different brands of vaccines contain different allergens. Some may contain latex or eggs. Talk to your doctor about your allergies to make sure that you get the right vaccine.  -pregnant or trying to get pregnant  -breast-feeding  How should I use this medicine?  This vaccine is for injection into a muscle or under the skin. It is given by a health care professional.  A copy of Vaccine Information Statements will be given before each vaccination. Read this sheet carefully each time. The sheet may change frequently.  Talk to your healthcare provider to see which vaccines are right for you. Some vaccines should not be used in all age groups.  Overdosage: If you think you have taken too much of this medicine contact a poison control center or emergency room at once.  NOTE: This medicine is only for you. Do not share this medicine with others.  What if I miss a dose?  This  does not apply.  What may interact with this medicine?  -chemotherapy or radiation therapy  -medicines that lower your immune system like etanercept, anakinra, infliximab, and adalimumab  -medicines that treat or prevent blood clots like warfarin  -phenytoin  -steroid medicines like prednisone or cortisone  -theophylline  -vaccines  This list may not describe all possible interactions. Give your health care provider a list of all the medicines, herbs, non-prescription drugs, or dietary supplements you use. Also tell them if you smoke, drink alcohol, or use illegal drugs. Some items may interact with your medicine.  What should I watch for while using this medicine?  Report any side effects that do not go away within 3 days to your doctor or health care professional. Call your health care provider if any unusual symptoms occur within 6 weeks of receiving this vaccine.  You may still catch the flu, but the illness is not usually as bad. You cannot get the flu from the vaccine. The vaccine will not protect against colds or other illnesses that may cause fever. The vaccine is needed every year.  What side effects may I notice from receiving this medicine?  Side effects that you should report to your doctor or health care professional as soon as possible:  -allergic reactions like skin rash, itching or hives, swelling of the face, lips, or tongue  Side effects that usually do not require medical attention (report to your doctor or health care professional if they continue or are bothersome):  -fever  -headache  -muscle aches and pains  -pain, tenderness, redness, or swelling at the injection site  -tiredness  This list may not describe all possible side effects. Call your doctor for medical advice about side effects. You may report side effects to FDA at 9-266-FDA-2116.  Where should I keep my medicine?  The vaccine will be given by a health care professional in a clinic, pharmacy, doctor's office, or other health care  setting. You will not be given vaccine doses to store at home.  NOTE: This sheet is a summary. It may not cover all possible information. If you have questions about this medicine, talk to your doctor, pharmacist, or health care provider.  © 2018 Elsevier/Gold Standard (2016-07-08 10:07:28)  Pneumococcal Conjugate Vaccine (PCV13) What You Need to Know  1. Why get vaccinated?  Vaccination can protect both children and adults from pneumococcal disease.  Pneumococcal disease is caused by bacteria that can spread from person to person through close contact. It can cause ear infections, and it can also lead to more serious infections of the:  · Lungs (pneumonia),  · Blood (bacteremia), and  · Covering of the brain and spinal cord (meningitis).    Pneumococcal pneumonia is most common among adults. Pneumococcal meningitis can cause deafness and brain damage, and it kills about 1 child in 10 who get it.  Anyone can get pneumococcal disease, but children under 2 years of age and adults 65 years and older, people with certain medical conditions, and cigarette smokers are at the highest risk.  Before there was a vaccine, the [place] saw:  · more than 700 cases of meningitis,  · about 13,000 blood infections,  · about 5 million ear infections, and  · about 200 deaths    in children under 5 each year from pneumococcal disease. Since vaccine became available, severe pneumococcal disease in these children has fallen by 88%.  About 18,000 older adults die of pneumococcal disease each year in the [place].  Treatment of pneumococcal infections with penicillin and other drugs is not as effective as it used to be, because some strains of the disease have become resistant to these drugs. This makes prevention of the disease, through vaccination, even more important.  2. PCV13 vaccine  Pneumococcal conjugate vaccine (called PCV13) protects against 13 types of pneumococcal bacteria.  PCV13 is routinely given to children at 2,  4, 6, and 12-15 months of age. It is also recommended for children and adults 2 to 64 years of age with certain health conditions, and for all adults 65 years of age and older. Your doctor can give you details.  3. Some people should not get this vaccine  Anyone who has ever had a life-threatening allergic reaction to a dose of this vaccine, to an earlier pneumococcal vaccine called PCV7, or to any vaccine containing diphtheria toxoid (for example, DTaP), should not get PCV13.  Anyone with a severe allergy to any component of PCV13 should not get the vaccine. Tell your doctor if the person being vaccinated has any severe allergies.  If the person scheduled for vaccination is not feeling well, your healthcare provider might decide to reschedule the shot on another day.  4. Risks of a vaccine reaction  With any medicine, including vaccines, there is a chance of reactions. These are usually mild and go away on their own, but serious reactions are also possible.  Problems reported following PCV13 varied by age and dose in the series. The most common problems reported among children were:  · About half became drowsy after the shot, had a temporary loss of appetite, or had redness or tenderness where the shot was given.  · About 1 out of 3 had swelling where the shot was given.  · About 1 out of 3 had a mild fever, and about 1 in 20 had a fever over 102.2°F.  · Up to about 8 out of 10 became fussy or irritable.    Adults have reported pain, redness, and swelling where the shot was given; also mild fever, fatigue, headache, chills, or muscle pain.  Young children who get PCV13 along with inactivated flu vaccine at the same time may be at increased risk for seizures caused by fever. Ask your doctor for more information.  Problems that could happen after any vaccine:  · People sometimes faint after a medical procedure, including vaccination. Sitting or lying down for about 15 minutes can help prevent fainting, and injuries  caused by a fall. Tell your doctor if you feel dizzy, or have vision changes or ringing in the ears.  · Some older children and adults get severe pain in the shoulder and have difficulty moving the arm where a shot was given. This happens very rarely.  · Any medication can cause a severe allergic reaction. Such reactions from a vaccine are very rare, estimated at about 1 in a million doses, and would happen within a few minutes to a few hours after the vaccination.  As with any medicine, there is a very small chance of a vaccine causing a serious injury or death.  The safety of vaccines is always being monitored. For more information, visit: www.cdc.gov/vaccinesafety/  5. What if there is a serious reaction?  What should I look for?  Look for anything that concerns you, such as signs of a severe allergic reaction, very high fever, or unusual behavior.  Signs of a severe allergic reaction can include hives, swelling of the face and throat, difficulty breathing, a fast heartbeat, dizziness, and weakness--usually within a few minutes to a few hours after the vaccination.  What should I do?  · If you think it is a severe allergic reaction or other emergency that can’t wait, call 9-1-1 or get the person to the nearest hospital. Otherwise, call your doctor.  · Reactions should be reported to the Vaccine Adverse Event Reporting System (VAERS). Your doctor should file this report, or you can do it yourself through the VAERS web site at www.vaers.hhs.gov, or by calling 1-526.986.8832.  ? VAERS does not give medical advice.  6. The National Vaccine Injury Compensation Program  The National Vaccine Injury Compensation Program (VICP) is a federal program that was created to compensate people who may have been injured by certain vaccines.  Persons who believe they may have been injured by a vaccine can learn about the program and about filing a claim by calling 1-701.326.4100 or visiting the VICP website at  www.hrsa.gov/vaccinecompensation. There is a time limit to file a claim for compensation.  7. How can I learn more?  · Ask your healthcare provider. He or she can give you the vaccine package insert or suggest other sources of information.  · Call your local or state health department.  · Contact the Centers for Disease Control and Prevention (CDC):  ? Call 1-647.432.3358 (0-909-FTP-INFO) or  ? Visit CDC's website at www.cdc.gov/vaccines  Vaccine Information Statement, PCV13 Vaccine (11/05/2015)  This information is not intended to replace advice given to you by your health care provider. Make sure you discuss any questions you have with your health care provider.  Document Released: 10/15/2007 Document Revised: 09/07/2017 Document Reviewed: 09/07/2017  Elsevier Interactive Patient Education © 2017 Elsevier Inc.

## 2018-09-29 LAB
T3FREE SERPL-MCNC: 4.5 PG/ML (ref 2–4.4)
T4 FREE SERPL-MCNC: 1.36 NG/DL (ref 0.93–1.7)
TSH SERPL DL<=0.005 MIU/L-ACNC: <0.005 MIU/ML (ref 0.27–4.2)

## 2018-10-30 RX ORDER — PANTOPRAZOLE SODIUM 20 MG/1
20 TABLET, DELAYED RELEASE ORAL DAILY
Qty: 15 TABLET | Refills: 0 | Status: SHIPPED | OUTPATIENT
Start: 2018-10-30

## 2018-12-13 RX ORDER — LORAZEPAM 0.5 MG/1
TABLET ORAL
Qty: 30 TABLET | Refills: 0 | OUTPATIENT
Start: 2018-12-13 | End: 2019-06-20 | Stop reason: SDUPTHER

## 2019-03-18 RX ORDER — CITALOPRAM 20 MG/1
TABLET ORAL
Qty: 90 TABLET | Refills: 1 | OUTPATIENT
Start: 2019-03-18

## 2019-06-20 ENCOUNTER — OFFICE VISIT (OUTPATIENT)
Dept: INTERNAL MEDICINE | Facility: CLINIC | Age: 69
End: 2019-06-20

## 2019-06-20 VITALS
TEMPERATURE: 97.9 F | SYSTOLIC BLOOD PRESSURE: 120 MMHG | HEART RATE: 83 BPM | WEIGHT: 141 LBS | HEIGHT: 63 IN | BODY MASS INDEX: 24.98 KG/M2 | OXYGEN SATURATION: 97 % | RESPIRATION RATE: 16 BRPM | DIASTOLIC BLOOD PRESSURE: 72 MMHG

## 2019-06-20 DIAGNOSIS — Z79.899 LONG TERM USE OF DRUG: ICD-10-CM

## 2019-06-20 DIAGNOSIS — J30.1 SEASONAL ALLERGIC RHINITIS DUE TO POLLEN: ICD-10-CM

## 2019-06-20 DIAGNOSIS — Z79.899 MEDICATION MANAGEMENT: ICD-10-CM

## 2019-06-20 DIAGNOSIS — F32.9 REACTIVE DEPRESSION: ICD-10-CM

## 2019-06-20 DIAGNOSIS — E05.90 HYPERTHYROIDISM: Primary | ICD-10-CM

## 2019-06-20 PROCEDURE — 99214 OFFICE O/P EST MOD 30 MIN: CPT | Performed by: INTERNAL MEDICINE

## 2019-06-20 RX ORDER — CITALOPRAM 20 MG/1
20 TABLET ORAL DAILY
Qty: 90 TABLET | Refills: 1 | Status: SHIPPED | OUTPATIENT
Start: 2019-06-20 | End: 2019-12-14 | Stop reason: SDUPTHER

## 2019-06-20 RX ORDER — LORAZEPAM 0.5 MG/1
0.5 TABLET ORAL
Qty: 30 TABLET | Refills: 0 | OUTPATIENT
Start: 2019-06-20 | End: 2019-12-14 | Stop reason: SDUPTHER

## 2019-06-20 RX ORDER — ONDANSETRON 4 MG/1
4 TABLET, FILM COATED ORAL 4 TIMES DAILY PRN
Qty: 15 TABLET | Refills: 1 | Status: SHIPPED | OUTPATIENT
Start: 2019-06-20 | End: 2020-04-27

## 2019-06-20 RX ORDER — METHIMAZOLE 10 MG/1
10 TABLET ORAL DAILY
Qty: 90 TABLET | Refills: 1 | Status: SHIPPED | OUTPATIENT
Start: 2019-06-20 | End: 2019-06-25 | Stop reason: SDUPTHER

## 2019-06-20 NOTE — PROGRESS NOTES
Subjective   Laverne Polk is a 69 y.o. female.     Chief Complaint   Patient presents with   • Thyroid Problem         Anxiety   Presents for follow-up visit. Symptoms include nervous/anxious behavior. Patient reports no chest pain, palpitations or shortness of breath. Symptoms occur occasionally. The quality of sleep is fair.     Her past medical history is significant for hyperthyroidism.   Hyperthyroidism   This is a chronic problem. The current episode started more than 1 year ago. The problem occurs constantly. The problem has been unchanged. Pertinent negatives include no chest pain, chills or fever. Nothing aggravates the symptoms.   Thyroid Problem   Presents for follow-up visit. Symptoms include anxiety, diarrhea and weight gain. Patient reports no cold intolerance, hair loss, heat intolerance, palpitations, tremors or weight loss. The symptoms have been worsening.        The following portions of the patient's history were reviewed and updated as appropriate: allergies, current medications, past social history and problem list.    Outpatient Medications Marked as Taking for the 6/20/19 encounter (Office Visit) with Berry Díaz MD   Medication Sig Dispense Refill   • citalopram (CeleXA) 20 MG tablet Take 1 tablet by mouth Daily. 90 tablet 1   • LORazepam (ATIVAN) 0.5 MG tablet Take 1 tablet by mouth every night at bedtime. 30 tablet 0   • methIMAzole (TAPAZOLE) 10 MG tablet Take 1 tablet by mouth Daily. 90 tablet 1   • ondansetron (ZOFRAN) 4 MG tablet Take 1 tablet by mouth 4 (Four) Times a Day As Needed for Nausea or Vomiting. 15 tablet 1   • pantoprazole (PROTONIX) 20 MG EC tablet Take 1 tablet by mouth Daily. 15 tablet 0   • [DISCONTINUED] citalopram (CeleXA) 20 MG tablet TAKE 1 TABLET BY MOUTH DAILY. 90 tablet 1   • [DISCONTINUED] LORazepam (ATIVAN) 0.5 MG tablet TAKE 1 TABLET BY MOUTH AT BEDTIME 30 tablet 0   • [DISCONTINUED] methIMAzole (TAPAZOLE) 10 MG tablet Take 1 tablet by mouth Daily. 90  tablet 1   • [DISCONTINUED] ondansetron (ZOFRAN) 4 MG tablet Take 1 tablet by mouth 4 (Four) Times a Day As Needed for Nausea or Vomiting. 15 tablet 1       Review of Systems   Constitutional: Positive for weight gain. Negative for chills, fever and weight loss.   Respiratory: Negative for shortness of breath and wheezing.    Cardiovascular: Negative for chest pain and palpitations.   Gastrointestinal: Positive for diarrhea.   Endocrine: Negative for cold intolerance and heat intolerance.   Neurological: Negative for tremors.   Psychiatric/Behavioral: The patient is nervous/anxious. The patient is not hyperactive.        Objective   Vitals:    06/20/19 1446   BP: 120/72   Pulse: 83   Resp: 16   Temp: 97.9 °F (36.6 °C)   SpO2: 97%          06/20/19  1446   Weight: 64 kg (141 lb)    [unfilled]  Body mass index is 24.98 kg/m².      Physical Exam   Constitutional: She appears well-developed and well-nourished.   Cardiovascular: Normal rate, regular rhythm and normal heart sounds. Exam reveals no friction rub.   No murmur heard.  Pulmonary/Chest: Effort normal and breath sounds normal. No respiratory distress. She has no wheezes. She has no rales.   Neurological: She has normal reflexes.         Problem List Items Addressed This Visit        Respiratory    AR (allergic rhinitis)       Endocrine    Hyperthyroidism - Primary    Relevant Medications    methIMAzole (TAPAZOLE) 10 MG tablet       Other    Depression    Relevant Medications    citalopram (CeleXA) 20 MG tablet    LORazepam (ATIVAN) 0.5 MG tablet        Assessment/Plan   In today for recheck of hyperthyroidism.  Indigestion is about the same.  She been running hyperthyroid in the past but had gotten thyroid nearly completely controlled last time.  She had quit her methimazole but has been mostly back on it at 10 mg once daily.  She will have a free T3, free T4, and TSH today along with annual lab work including CBC, CMP, lipids.  She plans to take hepatitis A  series.  Recheck in 6 months.  She is living in [place] right now.  Near the [facility].         Dragon disclaimer:   Much of this encounter note is an electronic transcription/translation of spoken language to printed text. The electronic translation of spoken language may permit erroneous, or at times, nonsensical words or phrases to be inadvertently transcribed; Although I have reviewed the note for such errors, some may still exist.

## 2019-06-21 LAB
ALBUMIN SERPL-MCNC: 5 G/DL (ref 3.5–5.2)
ALBUMIN/GLOB SERPL: 2.3 G/DL
ALP SERPL-CCNC: 91 U/L (ref 39–117)
ALT SERPL-CCNC: 18 U/L (ref 1–33)
AST SERPL-CCNC: 24 U/L (ref 1–32)
BASOPHILS # BLD AUTO: 0.06 10*3/MM3 (ref 0–0.2)
BASOPHILS NFR BLD AUTO: 0.7 % (ref 0–1.5)
BILIRUB SERPL-MCNC: 0.3 MG/DL (ref 0.2–1.2)
BUN SERPL-MCNC: 22 MG/DL (ref 8–23)
BUN/CREAT SERPL: 18.3 (ref 7–25)
CALCIUM SERPL-MCNC: 9.6 MG/DL (ref 8.6–10.5)
CHLORIDE SERPL-SCNC: 94 MMOL/L (ref 98–107)
CHOLEST SERPL-MCNC: 124 MG/DL (ref 0–200)
CO2 SERPL-SCNC: 26.6 MMOL/L (ref 22–29)
CREAT SERPL-MCNC: 1.2 MG/DL (ref 0.57–1)
EOSINOPHIL # BLD AUTO: 0.12 10*3/MM3 (ref 0–0.4)
EOSINOPHIL NFR BLD AUTO: 1.3 % (ref 0.3–6.2)
ERYTHROCYTE [DISTWIDTH] IN BLOOD BY AUTOMATED COUNT: 13.9 % (ref 12.3–15.4)
GLOBULIN SER CALC-MCNC: 2.2 GM/DL
GLUCOSE SERPL-MCNC: 191 MG/DL (ref 65–99)
HCT VFR BLD AUTO: 39.3 % (ref 34–46.6)
HDLC SERPL-MCNC: 44 MG/DL (ref 40–60)
HGB BLD-MCNC: 12.5 G/DL (ref 12–15.9)
IMM GRANULOCYTES # BLD AUTO: 0.02 10*3/MM3 (ref 0–0.05)
IMM GRANULOCYTES NFR BLD AUTO: 0.2 % (ref 0–0.5)
LDLC SERPL CALC-MCNC: 42 MG/DL (ref 0–100)
LYMPHOCYTES # BLD AUTO: 2.92 10*3/MM3 (ref 0.7–3.1)
LYMPHOCYTES NFR BLD AUTO: 32.1 % (ref 19.6–45.3)
MCH RBC QN AUTO: 28.9 PG (ref 26.6–33)
MCHC RBC AUTO-ENTMCNC: 31.8 G/DL (ref 31.5–35.7)
MCV RBC AUTO: 91 FL (ref 79–97)
MONOCYTES # BLD AUTO: 0.67 10*3/MM3 (ref 0.1–0.9)
MONOCYTES NFR BLD AUTO: 7.4 % (ref 5–12)
NEUTROPHILS # BLD AUTO: 5.31 10*3/MM3 (ref 1.7–7)
NEUTROPHILS NFR BLD AUTO: 58.3 % (ref 42.7–76)
NRBC BLD AUTO-RTO: 0 /100 WBC (ref 0–0.2)
PLATELET # BLD AUTO: 292 10*3/MM3 (ref 140–450)
POTASSIUM SERPL-SCNC: 4.1 MMOL/L (ref 3.5–5.2)
PROT SERPL-MCNC: 7.2 G/DL (ref 6–8.5)
RBC # BLD AUTO: 4.32 10*6/MM3 (ref 3.77–5.28)
SODIUM SERPL-SCNC: 136 MMOL/L (ref 136–145)
T3FREE SERPL-MCNC: 3 PG/ML (ref 2–4.4)
T4 FREE SERPL-MCNC: 1.21 NG/DL (ref 0.93–1.7)
TRIGL SERPL-MCNC: 189 MG/DL (ref 0–150)
TSH SERPL DL<=0.005 MIU/L-ACNC: 1.41 MIU/ML (ref 0.27–4.2)
VLDLC SERPL CALC-MCNC: 37.8 MG/DL
WBC # BLD AUTO: 9.1 10*3/MM3 (ref 3.4–10.8)

## 2019-06-24 LAB
EST. AVERAGE GLUCOSE BLD GHB EST-MCNC: 113.73 MG/DL
HBA1C MFR BLD: 5.59 % (ref 4.8–5.6)
Lab: NORMAL
WRITTEN AUTHORIZATION: NORMAL

## 2019-06-25 RX ORDER — METHIMAZOLE 5 MG/1
5 TABLET ORAL DAILY
Qty: 90 TABLET | Refills: 1 | Status: SHIPPED | OUTPATIENT
Start: 2019-06-25 | End: 2019-12-14 | Stop reason: SDUPTHER

## 2019-06-26 LAB — DRUGS UR: NORMAL

## 2019-12-16 ENCOUNTER — RESULTS ENCOUNTER (OUTPATIENT)
Dept: INTERNAL MEDICINE | Facility: CLINIC | Age: 69
End: 2019-12-16

## 2019-12-16 DIAGNOSIS — E05.90 HYPERTHYROIDISM: ICD-10-CM

## 2019-12-16 DIAGNOSIS — Z79.899 MEDICATION MANAGEMENT: ICD-10-CM

## 2019-12-16 RX ORDER — CITALOPRAM 20 MG/1
TABLET ORAL
Qty: 90 TABLET | Refills: 1 | Status: SHIPPED | OUTPATIENT
Start: 2019-12-16 | End: 2020-03-09 | Stop reason: SDUPTHER

## 2019-12-16 RX ORDER — METHIMAZOLE 5 MG/1
TABLET ORAL
Qty: 90 TABLET | Refills: 1 | Status: SHIPPED | OUTPATIENT
Start: 2019-12-16 | End: 2020-03-09 | Stop reason: SDUPTHER

## 2019-12-16 RX ORDER — LORAZEPAM 0.5 MG/1
TABLET ORAL
Qty: 30 TABLET | Refills: 0 | OUTPATIENT
Start: 2019-12-16 | End: 2020-03-09 | Stop reason: SDUPTHER

## 2020-03-09 PROBLEM — G47.09 OTHER INSOMNIA: Status: ACTIVE | Noted: 2020-03-09

## 2020-03-09 RX ORDER — METHIMAZOLE 5 MG/1
5 TABLET ORAL DAILY
Qty: 90 TABLET | Refills: 1 | Status: SHIPPED | OUTPATIENT
Start: 2020-03-09 | End: 2020-08-31

## 2020-03-09 RX ORDER — CITALOPRAM 20 MG/1
20 TABLET ORAL DAILY
Qty: 90 TABLET | Refills: 1 | Status: SHIPPED | OUTPATIENT
Start: 2020-03-09 | End: 2020-08-31

## 2020-03-09 RX ORDER — LORAZEPAM 0.5 MG/1
0.5 TABLET ORAL
Qty: 30 TABLET | Refills: 0 | OUTPATIENT
Start: 2020-03-09 | End: 2021-08-31

## 2020-03-09 NOTE — TELEPHONE ENCOUNTER
Last office visit: 6-20-19  Next office visit: Not scheduled (see note below)  Last refill: 12-16-19  Zach: 12-16-19    Patient is stuck in [place] with  who is currently in the ICU.     These prescriptions are okay for refill.

## 2020-03-09 NOTE — TELEPHONE ENCOUNTER
Pt daughter called today to let us know pt is out of meds for the listed . The Pt is in [place] currently and was wondering if these meds could get sent to a location in this city . They are going to call back with location and phone number .     Any questions with this case the Pt daughter is currently Point of contact     Kyra block -[phone]         Add on to previous listed     citalopram (CeleXA) 20 MG tablet   LORazepam (ATIVAN) 0.5 MG tablet  methIMAzole (TAPAZOLE) 5 MG tablet      Listed location that this needs   To go to as follows     Phone number to location  [phone]  [facility]   [address] [facility] [address]  [facility]     Location opens up at 8am but that would be 11am our time

## 2020-04-27 ENCOUNTER — OFFICE VISIT (OUTPATIENT)
Dept: INTERNAL MEDICINE | Facility: CLINIC | Age: 70
End: 2020-04-27

## 2020-04-27 VITALS — OXYGEN SATURATION: 97 % | HEART RATE: 67 BPM | SYSTOLIC BLOOD PRESSURE: 106 MMHG | DIASTOLIC BLOOD PRESSURE: 72 MMHG

## 2020-04-27 DIAGNOSIS — F41.9 ANXIETY: Primary | ICD-10-CM

## 2020-04-27 DIAGNOSIS — E05.90 HYPERTHYROIDISM: ICD-10-CM

## 2020-04-27 DIAGNOSIS — Z79.899 MEDICATION MANAGEMENT: ICD-10-CM

## 2020-04-27 DIAGNOSIS — F32.9 REACTIVE DEPRESSION: ICD-10-CM

## 2020-04-27 PROCEDURE — 99213 OFFICE O/P EST LOW 20 MIN: CPT | Performed by: INTERNAL MEDICINE

## 2020-04-27 NOTE — PROGRESS NOTES
You have chosen to receive care through a telephone visit. Do you consent to use a telephone visit for your medical care today? Yes    Advance Care Planning   ACP discussion was held with the patient during this visit. Patient does not have an advance directive, information provided.

## 2020-04-27 NOTE — PROGRESS NOTES
Subjective   Laverne Polk is a 70 y.o. female.     Chief Complaint   Patient presents with   • Anxiety   • Hyperthyroidism         Anxiety   Presents for follow-up visit. Symptoms include nervous/anxious behavior. Patient reports no chest pain, palpitations or shortness of breath. Symptoms occur occasionally. The quality of sleep is fair.     Her past medical history is significant for hyperthyroidism.   Hyperthyroidism   This is a chronic problem. The current episode started more than 1 year ago. The problem occurs constantly. The problem has been unchanged. Associated symptoms include coughing. Pertinent negatives include no chest pain, chills or fever. Nothing aggravates the symptoms.   Thyroid Problem   Presents for follow-up visit. Symptoms include anxiety, diarrhea and weight gain. Patient reports no cold intolerance, hair loss, heat intolerance, palpitations, tremors or weight loss. The symptoms have been worsening.        The following portions of the patient's history were reviewed and updated as appropriate: allergies, current medications, past social history and problem list.    Outpatient Medications Marked as Taking for the 4/27/20 encounter (Office Visit) with Berry Díaz MD   Medication Sig Dispense Refill   • citalopram (CeleXA) 20 MG tablet Take 1 tablet by mouth Daily. 90 tablet 1   • LORazepam (ATIVAN) 0.5 MG tablet Take 1 tablet by mouth every night at bedtime. 30 tablet 0   • methIMAzole (TAPAZOLE) 5 MG tablet Take 1 tablet by mouth Daily. 90 tablet 1   • pantoprazole (PROTONIX) 20 MG EC tablet Take 1 tablet by mouth Daily. 15 tablet 0   • [DISCONTINUED] ondansetron (ZOFRAN) 4 MG tablet Take 1 tablet by mouth 4 (Four) Times a Day As Needed for Nausea or Vomiting. 15 tablet 1       Review of Systems   Constitutional: Positive for weight gain. Negative for chills, fever and weight loss.   Respiratory: Positive for cough. Negative for shortness of breath and wheezing.    Cardiovascular:  Negative for chest pain and palpitations.   Gastrointestinal: Positive for diarrhea.   Endocrine: Negative for cold intolerance and heat intolerance.   Neurological: Negative for tremors.   Psychiatric/Behavioral: The patient is nervous/anxious. The patient is not hyperactive.        Objective   Vitals:    04/27/20 0811   BP: 106/72   Pulse: 67   SpO2: 97%      There were no vitals filed for this visit. [unfilled]  There is no height or weight on file to calculate BMI.      Physical Exam   Constitutional: She appears well-developed and well-nourished. No distress.   Skin: She is not diaphoretic.   Psychiatric: She has a normal mood and affect. Her behavior is normal. Judgment and thought content normal.         Problem List Items Addressed This Visit        Endocrine    Hyperthyroidism       Other    Depression      Other Visit Diagnoses     Anxiety    -  Primary        Assessment/Plan   Other visit today due to the COVID pandemic.  In today for recheck of hyperthyroidism.  Indigestion is about the same.  She been running hyperthyroid in the past but had gotten thyroid nearly completely controlled last time.  She is back on 10 mg of methimazole.  She will have a free T3, free T4, and TSH today along with annual lab work including CBC, CMP, lipids.  Recheck in 6 months.  She is living in [place] right now.  Near the [facility].  Spent 15 minutes with patient today on the visit.         Dragon disclaimer:   Much of this encounter note is an electronic transcription/translation of spoken language to printed text. The electronic translation of spoken language may permit erroneous, or at times, nonsensical words or phrases to be inadvertently transcribed; Although I have reviewed the note for such errors, some may still exist.

## 2020-04-29 LAB
ALBUMIN SERPL-MCNC: 4.7 G/DL (ref 3.7–4.7)
ALBUMIN/GLOB SERPL: 1.8 {RATIO} (ref 1.2–2.2)
ALP SERPL-CCNC: 146 IU/L (ref 39–117)
ALT SERPL-CCNC: 8 IU/L (ref 0–32)
AST SERPL-CCNC: 13 IU/L (ref 0–40)
BASOPHILS # BLD AUTO: 0.1 X10E3/UL (ref 0–0.2)
BASOPHILS NFR BLD AUTO: 1 %
BILIRUB SERPL-MCNC: 0.3 MG/DL (ref 0–1.2)
BUN SERPL-MCNC: 14 MG/DL (ref 8–27)
BUN/CREAT SERPL: 15 (ref 12–28)
CALCIUM SERPL-MCNC: 9.9 MG/DL (ref 8.7–10.3)
CHLORIDE SERPL-SCNC: 102 MMOL/L (ref 96–106)
CHOLEST SERPL-MCNC: 214 MG/DL (ref 100–199)
CHOLEST/HDLC SERPL: 2.9 RATIO (ref 0–4.4)
CO2 SERPL-SCNC: 21 MMOL/L (ref 20–29)
CREAT SERPL-MCNC: 0.91 MG/DL (ref 0.57–1)
EOSINOPHIL # BLD AUTO: 0.1 X10E3/UL (ref 0–0.4)
EOSINOPHIL NFR BLD AUTO: 2 %
ERYTHROCYTE [DISTWIDTH] IN BLOOD BY AUTOMATED COUNT: 13.1 % (ref 11.7–15.4)
GLOBULIN SER CALC-MCNC: 2.6 G/DL (ref 1.5–4.5)
GLUCOSE SERPL-MCNC: 89 MG/DL (ref 65–99)
HCT VFR BLD AUTO: 38.4 % (ref 34–46.6)
HDLC SERPL-MCNC: 73 MG/DL
HGB BLD-MCNC: 12.5 G/DL (ref 11.1–15.9)
IMM GRANULOCYTES # BLD AUTO: 0 X10E3/UL (ref 0–0.1)
IMM GRANULOCYTES NFR BLD AUTO: 0 %
LDLC SERPL CALC-MCNC: 108 MG/DL (ref 0–99)
LYMPHOCYTES # BLD AUTO: 2.4 X10E3/UL (ref 0.7–3.1)
LYMPHOCYTES NFR BLD AUTO: 38 %
MCH RBC QN AUTO: 28.2 PG (ref 26.6–33)
MCHC RBC AUTO-ENTMCNC: 32.6 G/DL (ref 31.5–35.7)
MCV RBC AUTO: 87 FL (ref 79–97)
MONOCYTES # BLD AUTO: 0.5 X10E3/UL (ref 0.1–0.9)
MONOCYTES NFR BLD AUTO: 8 %
NEUTROPHILS # BLD AUTO: 3.2 X10E3/UL (ref 1.4–7)
NEUTROPHILS NFR BLD AUTO: 51 %
PLATELET # BLD AUTO: 279 X10E3/UL (ref 150–450)
POTASSIUM SERPL-SCNC: 4.7 MMOL/L (ref 3.5–5.2)
PROT SERPL-MCNC: 7.3 G/DL (ref 6–8.5)
RBC # BLD AUTO: 4.44 X10E6/UL (ref 3.77–5.28)
SODIUM SERPL-SCNC: 141 MMOL/L (ref 134–144)
T3FREE SERPL-MCNC: 2.5 PG/ML (ref 2–4.4)
T4 FREE SERPL-MCNC: 0.77 NG/DL (ref 0.82–1.77)
TRIGL SERPL-MCNC: 165 MG/DL (ref 0–149)
TSH SERPL DL<=0.005 MIU/L-ACNC: 2.71 UIU/ML (ref 0.45–4.5)
VLDLC SERPL CALC-MCNC: 33 MG/DL (ref 5–40)
WBC # BLD AUTO: 6.3 X10E3/UL (ref 3.4–10.8)

## 2020-06-12 ENCOUNTER — TELEMEDICINE (OUTPATIENT)
Dept: INTERNAL MEDICINE | Facility: CLINIC | Age: 70
End: 2020-06-12

## 2020-06-12 DIAGNOSIS — R21 RASH: Primary | ICD-10-CM

## 2020-06-12 PROCEDURE — 99213 OFFICE O/P EST LOW 20 MIN: CPT | Performed by: INTERNAL MEDICINE

## 2020-06-12 RX ORDER — NYSTATIN AND TRIAMCINOLONE ACETONIDE 100000; 1 [USP'U]/G; MG/G
OINTMENT TOPICAL 2 TIMES DAILY
Qty: 30 G | Refills: 1 | Status: SHIPPED | OUTPATIENT
Start: 2020-06-12 | End: 2021-04-23

## 2020-06-12 NOTE — PROGRESS NOTES
Subjective   Laverne Polk is a 70 y.o. female.     Chief Complaint   Patient presents with   • Rash         Rash   This is a new problem. The current episode started 1 to 4 weeks ago. The affected locations include the torso. The rash is characterized by burning, redness and itchiness. Pertinent negatives include no fever. Past treatments include antihistamine.        The following portions of the patient's history were reviewed and updated as appropriate: allergies, current medications, past social history and problem list.    Outpatient Medications Marked as Taking for the 6/12/20 encounter (Telemedicine) with Berry Díaz MD   Medication Sig Dispense Refill   • citalopram (CeleXA) 20 MG tablet Take 1 tablet by mouth Daily. 90 tablet 1   • LORazepam (ATIVAN) 0.5 MG tablet Take 1 tablet by mouth every night at bedtime. 30 tablet 0   • methIMAzole (TAPAZOLE) 5 MG tablet Take 1 tablet by mouth Daily. 90 tablet 1   • pantoprazole (PROTONIX) 20 MG EC tablet Take 1 tablet by mouth Daily. 15 tablet 0       Review of Systems   Constitutional: Negative for chills and fever.   Skin: Positive for rash.       Objective   There were no vitals filed for this visit.   Wt Readings from Last 3 Encounters:   06/20/19 64 kg (141 lb)   09/28/18 63.5 kg (140 lb)   05/29/18 61.4 kg (135 lb 6.4 oz)    There is no height or weight on file to calculate BMI.      Physical Exam   Constitutional: She appears well-developed and well-nourished. No distress.   Skin: Rash noted. She is not diaphoretic.         Problem List Items Addressed This Visit        Musculoskeletal and Integument    Rash - Primary        Assessment/Plan   Video visit today due to COVID pandemic.  She has a rash under the left breast area.  She had a biopsy in the left upper quadrant of the abdomen or lower chest which looks like it is healing appropriately.  She has this painful rash on either side of that that looks like a reaction to tape.  There is also  a little bit of intertrigo underneath the left breast.  We will add some Mycolog cream for the breast.  Triamcinolone ointment for the tape rash.  Spent 13 minutes with patient on the visit today.             Dragon disclaimer:   Much of this encounter note is an electronic transcription/translation of spoken language to printed text. The electronic translation of spoken language may permit erroneous, or at times, nonsensical words or phrases to be inadvertently transcribed; Although I have reviewed the note for such errors, some may still exist.

## 2020-08-31 RX ORDER — CITALOPRAM 20 MG/1
TABLET ORAL
Qty: 90 TABLET | Refills: 1 | Status: SHIPPED | OUTPATIENT
Start: 2020-08-31 | End: 2021-07-06

## 2020-08-31 RX ORDER — METHIMAZOLE 5 MG/1
TABLET ORAL
Qty: 90 TABLET | Refills: 0 | Status: SHIPPED | OUTPATIENT
Start: 2020-08-31 | End: 2020-10-30 | Stop reason: DRUGHIGH

## 2020-10-23 ENCOUNTER — TELEMEDICINE (OUTPATIENT)
Dept: INTERNAL MEDICINE | Facility: CLINIC | Age: 70
End: 2020-10-23

## 2020-10-23 DIAGNOSIS — Z79.899 MEDICATION MANAGEMENT: ICD-10-CM

## 2020-10-23 DIAGNOSIS — F32.9 REACTIVE DEPRESSION: ICD-10-CM

## 2020-10-23 DIAGNOSIS — R19.7 POSTCHOLECYSTECTOMY DIARRHEA: ICD-10-CM

## 2020-10-23 DIAGNOSIS — E05.90 HYPERTHYROIDISM: Primary | ICD-10-CM

## 2020-10-23 DIAGNOSIS — K91.89 POSTCHOLECYSTECTOMY DIARRHEA: ICD-10-CM

## 2020-10-23 DIAGNOSIS — G47.09 OTHER INSOMNIA: ICD-10-CM

## 2020-10-23 PROCEDURE — 99214 OFFICE O/P EST MOD 30 MIN: CPT | Performed by: INTERNAL MEDICINE

## 2020-10-23 RX ORDER — CHOLESTYRAMINE 4 G/9G
2 POWDER, FOR SUSPENSION ORAL 2 TIMES DAILY WITH MEALS
Qty: 378 G | Refills: 3 | Status: SHIPPED | OUTPATIENT
Start: 2020-10-23 | End: 2021-10-22

## 2020-10-23 NOTE — PROGRESS NOTES
Subjective   Laverne Polk is a 70 y.o. female.     Chief Complaint   Patient presents with   • Hyperthyroidism   • Anxiety         Anxiety  Presents for follow-up visit. Symptoms include nervous/anxious behavior. Patient reports no chest pain, palpitations or shortness of breath. Symptoms occur occasionally. The quality of sleep is fair.     Her past medical history is significant for hyperthyroidism.   Hyperthyroidism  This is a chronic problem. The current episode started more than 1 year ago. The problem occurs constantly. The problem has been unchanged. Associated symptoms include coughing. Pertinent negatives include no chest pain, chills or fever. Nothing aggravates the symptoms.        The following portions of the patient's history were reviewed and updated as appropriate: allergies, current medications, past social history and problem list.    Outpatient Medications Marked as Taking for the 10/23/20 encounter (Telemedicine) with Berry Díaz MD   Medication Sig Dispense Refill   • citalopram (CeleXA) 20 MG tablet TAKE 1 TABLET BY MOUTH EVERY DAY 90 tablet 1   • LORazepam (ATIVAN) 0.5 MG tablet Take 1 tablet by mouth every night at bedtime. 30 tablet 0   • methIMAzole (TAPAZOLE) 5 MG tablet TAKE 1 TABLET BY MOUTH EVERY DAY 90 tablet 0   • nystatin-triamcinolone (MYCOLOG) 332820-5.1 UNIT/GM-% ointment Apply  topically to the appropriate area as directed 2 (Two) Times a Day. 30 g 1   • pantoprazole (PROTONIX) 20 MG EC tablet Take 1 tablet by mouth Daily. 15 tablet 0   • triamcinolone (KENALOG) 0.1 % ointment Apply  topically to the appropriate area as directed 2 (Two) Times a Day. Applied to rash on either side of the biopsy site. 15 g 1       Review of Systems   Constitutional: Negative for chills and fever.   Respiratory: Positive for cough. Negative for shortness of breath and wheezing.    Cardiovascular: Negative for chest pain and palpitations.   Psychiatric/Behavioral: Positive for dysphoric  mood. The patient is nervous/anxious. The patient is not hyperactive.        Objective   There were no vitals filed for this visit.   There were no vitals filed for this visit. [unfilled]  There is no height or weight on file to calculate BMI.      Physical Exam   Constitutional: She appears well-developed. She does not appear ill. No distress.   Pulmonary/Chest: Effort normal.   Abdominal: Normal appearance.   Neurological: She is alert.   Psychiatric: Her behavior is normal. Mood, judgment and thought content normal.         Problems Addressed this Visit        Digestive    Postcholecystectomy diarrhea       Endocrine    Hyperthyroidism - Primary    Relevant Orders    TSH    T4, Free    T3, Free       Other    Depression    Other insomnia      Other Visit Diagnoses     Medication management        Relevant Orders    CBC & Differential      Diagnoses       Codes Comments    Hyperthyroidism    -  Primary ICD-10-CM: E05.90  ICD-9-CM: 242.90     Reactive depression     ICD-10-CM: F32.9  ICD-9-CM: 300.4     Other insomnia     ICD-10-CM: G47.09  ICD-9-CM: 780.52     Medication management     ICD-10-CM: Z79.899  ICD-9-CM: V58.69     Postcholecystectomy diarrhea     ICD-10-CM: R19.7, Z90.49  ICD-9-CM: 564.4         Assessment/Plan   Video visit today due to the COVID pandemic.  In today for recheck of hyperthyroidism.  Indigestion is about the same.  She been running hyperthyroid in the past but had gotten thyroid nearly completely controlled last time.  She is currently on 5 mg of methimazole.  She had annual lab work April 2020 including a free T3, free T4, and TSH also CBC, CMP, lipids.  Today we will check a TSH, free T4, free T3 and CBC.  Recheck in 6 months.  She is living in [place] right now.  Near the [facility].  She plans to get her flu shot soon.  She will get a TD AP with that.  She is having problems with postcholecystectomy diarrhea for many years.  Will try on some cholestyramine one half scoop once  or twice daily and see how she does.  Spent 15 minutes with patient today on the visit.           Bakari disclaimer:   Much of this encounter note is an electronic transcription/translation of spoken language to printed text. The electronic translation of spoken language may permit erroneous, or at times, nonsensical words or phrases to be inadvertently transcribed; Although I have reviewed the note for such errors, some may still exist.

## 2020-10-30 LAB
BASOPHILS # BLD AUTO: 0.1 X10E3/UL (ref 0–0.2)
BASOPHILS NFR BLD AUTO: 1 %
EOSINOPHIL # BLD AUTO: 0.2 X10E3/UL (ref 0–0.4)
EOSINOPHIL NFR BLD AUTO: 2 %
ERYTHROCYTE [DISTWIDTH] IN BLOOD BY AUTOMATED COUNT: 13.4 % (ref 11.7–15.4)
HCT VFR BLD AUTO: 37.8 % (ref 34–46.6)
HGB BLD-MCNC: 12.4 G/DL (ref 11.1–15.9)
IMM GRANULOCYTES # BLD AUTO: 0 X10E3/UL (ref 0–0.1)
IMM GRANULOCYTES NFR BLD AUTO: 0 %
LYMPHOCYTES # BLD AUTO: 2.3 X10E3/UL (ref 0.7–3.1)
LYMPHOCYTES NFR BLD AUTO: 34 %
MCH RBC QN AUTO: 28.5 PG (ref 26.6–33)
MCHC RBC AUTO-ENTMCNC: 32.8 G/DL (ref 31.5–35.7)
MCV RBC AUTO: 87 FL (ref 79–97)
MONOCYTES # BLD AUTO: 0.6 X10E3/UL (ref 0.1–0.9)
MONOCYTES NFR BLD AUTO: 8 %
NEUTROPHILS # BLD AUTO: 3.7 X10E3/UL (ref 1.4–7)
NEUTROPHILS NFR BLD AUTO: 55 %
PLATELET # BLD AUTO: 283 X10E3/UL (ref 150–450)
RBC # BLD AUTO: 4.35 X10E6/UL (ref 3.77–5.28)
T3FREE SERPL-MCNC: 2.2 PG/ML (ref 2–4.4)
T4 FREE SERPL-MCNC: 0.64 NG/DL (ref 0.82–1.77)
TSH SERPL DL<=0.005 MIU/L-ACNC: 10.6 UIU/ML (ref 0.45–4.5)
WBC # BLD AUTO: 6.9 X10E3/UL (ref 3.4–10.8)

## 2020-10-30 RX ORDER — METHIMAZOLE 5 MG/1
2.5 TABLET ORAL DAILY
COMMUNITY
End: 2021-02-08

## 2021-02-08 RX ORDER — METHIMAZOLE 5 MG/1
TABLET ORAL
Qty: 90 TABLET | Refills: 1 | Status: SHIPPED | OUTPATIENT
Start: 2021-02-08 | End: 2021-04-23

## 2021-04-23 ENCOUNTER — TELEMEDICINE (OUTPATIENT)
Dept: INTERNAL MEDICINE | Facility: CLINIC | Age: 71
End: 2021-04-23

## 2021-04-23 DIAGNOSIS — F32.9 REACTIVE DEPRESSION: ICD-10-CM

## 2021-04-23 DIAGNOSIS — Z79.899 MEDICATION MANAGEMENT: ICD-10-CM

## 2021-04-23 DIAGNOSIS — E05.90 HYPERTHYROIDISM: Primary | ICD-10-CM

## 2021-04-23 DIAGNOSIS — G47.09 OTHER INSOMNIA: ICD-10-CM

## 2021-04-23 PROBLEM — R21 RASH: Status: RESOLVED | Noted: 2020-06-12 | Resolved: 2021-04-23

## 2021-04-23 PROCEDURE — 99214 OFFICE O/P EST MOD 30 MIN: CPT | Performed by: INTERNAL MEDICINE

## 2021-04-23 RX ORDER — METHIMAZOLE 5 MG/1
2.5 TABLET ORAL DAILY
Qty: 45 TABLET | Refills: 1 | Status: SHIPPED | OUTPATIENT
Start: 2021-04-23 | End: 2021-08-17

## 2021-04-23 NOTE — PROGRESS NOTES
Subjective   Laverne Polk is a 71 y.o. female.     Chief Complaint   Patient presents with   • Anxiety   • Diarrhea   • Hypothyroidism         Anxiety  Presents for follow-up visit. Symptoms include nervous/anxious behavior. Symptoms occur occasionally. The quality of sleep is fair.     Her past medical history is significant for hyperthyroidism.   Hyperthyroidism  This is a chronic problem. The current episode started more than 1 year ago. The problem occurs constantly. The problem has been unchanged. Associated symptoms include coughing. Nothing aggravates the symptoms.        The following portions of the patient's history were reviewed and updated as appropriate: allergies, current medications, past social history and problem list.    Outpatient Medications Marked as Taking for the 4/23/21 encounter (Telemedicine) with Berry Díaz MD   Medication Sig Dispense Refill   • cholestyramine (Questran) 4 GM/DOSE powder Take 0.5 packets by mouth 2 (Two) Times a Day With Meals. 378 g 3   • citalopram (CeleXA) 20 MG tablet TAKE 1 TABLET BY MOUTH EVERY DAY 90 tablet 1   • LORazepam (ATIVAN) 0.5 MG tablet Take 1 tablet by mouth every night at bedtime. 30 tablet 0   • methIMAzole (TAPAZOLE) 5 MG tablet Take 0.5 tablets by mouth Daily. 45 tablet 1   • pantoprazole (PROTONIX) 20 MG EC tablet Take 1 tablet by mouth Daily. 15 tablet 0   • triamcinolone (KENALOG) 0.1 % ointment Apply  topically to the appropriate area as directed 2 (Two) Times a Day. Applied to rash on either side of the biopsy site. 15 g 1   • [DISCONTINUED] methIMAzole (TAPAZOLE) 5 MG tablet TAKE 1 TABLET BY MOUTH EVERY DAY 90 tablet 1       Review of Systems   Respiratory: Positive for cough.    Gastrointestinal: Negative for constipation and diarrhea.   Psychiatric/Behavioral: Positive for dysphoric mood. The patient is nervous/anxious. The patient is not hyperactive.        Objective   There were no vitals filed for this visit.   There were no  vitals filed for this visit. [unfilled]  There is no height or weight on file to calculate BMI.      Physical Exam   Constitutional: She appears well-developed.   Neurological: She is alert.   Psychiatric: Her behavior is normal. Mood, judgment and thought content normal.         Problems Addressed this Visit        Endocrine and Metabolic    Hyperthyroidism - Primary    Relevant Medications    methIMAzole (TAPAZOLE) 5 MG tablet       Mental Health    Depression       Sleep    Other insomnia      Diagnoses       Codes Comments    Hyperthyroidism    -  Primary ICD-10-CM: E05.90  ICD-9-CM: 242.90     Reactive depression     ICD-10-CM: F32.9  ICD-9-CM: 300.4     Other insomnia     ICD-10-CM: G47.09  ICD-9-CM: 780.52         Assessment/Plan   Video visit today due to the COVID pandemic.  In today for recheck of hyperthyroidism.  Indigestion is about the same.  She been running hyperthyroid in the past but had gotten thyroid nearly completely controlled last time.  She is currently on 2.5 mg of methimazole.  She has annual lab work today April 2021 including a free T3, free T4, and TSH also CBC, CMP, lipids.  Every 6 months we will check a TSH, free T4, free T3 and CBC.  Recheck in 6 months.  She is living in [place] right now.  Near the [facility].  She will get a TD AP with that.  She is having problems with postcholecystectomy diarrhea for many years.  Doing better now with cholestyramine one half scoop once daily and now is off of it. She knows she is due for a mammogram and DEXA scan. We will have to do annual wellness visit when she is in town sometime. Spent 15 minutes with patient today on the visit.      The above information was reviewed again today 04/23/21.  It continues to be accurate as reflected above and is unchanged.  History, physical and review of systems all reviewed and are unchanged.  Medications were reviewed today and continue the current dosing.           Bakari disclaimer:   Much of this  encounter note is an electronic transcription/translation of spoken language to printed text. The electronic translation of spoken language may permit erroneous, or at times, nonsensical words or phrases to be inadvertently transcribed; Although I have reviewed the note for such errors, some may still exist.

## 2021-04-30 PROBLEM — M88.9 PAGET'S BONE DISEASE: Status: ACTIVE | Noted: 2021-04-30

## 2021-04-30 LAB
ALBUMIN SERPL-MCNC: 4.2 G/DL (ref 3.7–4.7)
ALBUMIN/GLOB SERPL: 1.8 {RATIO} (ref 1.2–2.2)
ALP SERPL-CCNC: 171 IU/L (ref 39–117)
ALT SERPL-CCNC: 10 IU/L (ref 0–32)
AST SERPL-CCNC: 15 IU/L (ref 0–40)
BASOPHILS # BLD AUTO: 0.1 X10E3/UL (ref 0–0.2)
BASOPHILS NFR BLD AUTO: 1 %
BILIRUB SERPL-MCNC: 0.4 MG/DL (ref 0–1.2)
BUN SERPL-MCNC: 12 MG/DL (ref 8–27)
BUN/CREAT SERPL: 16 (ref 12–28)
CALCIUM SERPL-MCNC: 9.8 MG/DL (ref 8.7–10.3)
CHLORIDE SERPL-SCNC: 107 MMOL/L (ref 96–106)
CHOLEST SERPL-MCNC: 167 MG/DL (ref 100–199)
CHOLEST/HDLC SERPL: 2.5 RATIO (ref 0–4.4)
CO2 SERPL-SCNC: 25 MMOL/L (ref 20–29)
CREAT SERPL-MCNC: 0.77 MG/DL (ref 0.57–1)
EOSINOPHIL # BLD AUTO: 0.1 X10E3/UL (ref 0–0.4)
EOSINOPHIL NFR BLD AUTO: 2 %
ERYTHROCYTE [DISTWIDTH] IN BLOOD BY AUTOMATED COUNT: 12.3 % (ref 11.7–15.4)
GLOBULIN SER CALC-MCNC: 2.3 G/DL (ref 1.5–4.5)
GLUCOSE SERPL-MCNC: 91 MG/DL (ref 65–99)
HCT VFR BLD AUTO: 34.2 % (ref 34–46.6)
HDLC SERPL-MCNC: 67 MG/DL
HGB BLD-MCNC: 12 G/DL (ref 11.1–15.9)
IMM GRANULOCYTES # BLD AUTO: 0 X10E3/UL (ref 0–0.1)
IMM GRANULOCYTES NFR BLD AUTO: 0 %
LDLC SERPL CALC-MCNC: 82 MG/DL (ref 0–99)
LYMPHOCYTES # BLD AUTO: 2.3 X10E3/UL (ref 0.7–3.1)
LYMPHOCYTES NFR BLD AUTO: 32 %
MCH RBC QN AUTO: 29.6 PG (ref 26.6–33)
MCHC RBC AUTO-ENTMCNC: 35.1 G/DL (ref 31.5–35.7)
MCV RBC AUTO: 84 FL (ref 79–97)
MONOCYTES # BLD AUTO: 0.7 X10E3/UL (ref 0.1–0.9)
MONOCYTES NFR BLD AUTO: 9 %
NEUTROPHILS # BLD AUTO: 4 X10E3/UL (ref 1.4–7)
NEUTROPHILS NFR BLD AUTO: 56 %
PLATELET # BLD AUTO: 292 X10E3/UL (ref 150–450)
POTASSIUM SERPL-SCNC: 4.7 MMOL/L (ref 3.5–5.2)
PROT SERPL-MCNC: 6.5 G/DL (ref 6–8.5)
RBC # BLD AUTO: 4.06 X10E6/UL (ref 3.77–5.28)
SODIUM SERPL-SCNC: 144 MMOL/L (ref 134–144)
T3FREE SERPL-MCNC: 3.3 PG/ML (ref 2–4.4)
T4 FREE SERPL-MCNC: 0.96 NG/DL (ref 0.82–1.77)
TRIGL SERPL-MCNC: 100 MG/DL (ref 0–149)
TSH SERPL DL<=0.005 MIU/L-ACNC: 1.57 UIU/ML (ref 0.45–4.5)
VLDLC SERPL CALC-MCNC: 18 MG/DL (ref 5–40)
WBC # BLD AUTO: 7.2 X10E3/UL (ref 3.4–10.8)

## 2021-07-06 RX ORDER — CITALOPRAM 20 MG/1
TABLET ORAL
Qty: 90 TABLET | Refills: 1 | Status: SHIPPED | OUTPATIENT
Start: 2021-07-06 | End: 2022-01-10

## 2021-08-17 RX ORDER — METHIMAZOLE 5 MG/1
TABLET ORAL
Qty: 90 TABLET | Refills: 1 | Status: SHIPPED | OUTPATIENT
Start: 2021-08-17 | End: 2022-02-28

## 2021-08-31 DIAGNOSIS — F41.9 ANXIETY: Primary | ICD-10-CM

## 2021-08-31 RX ORDER — LORAZEPAM 0.5 MG/1
TABLET ORAL
Qty: 30 TABLET | Refills: 1 | Status: SHIPPED | OUTPATIENT
Start: 2021-08-31

## 2021-10-22 ENCOUNTER — TELEPHONE (OUTPATIENT)
Dept: INTERNAL MEDICINE | Facility: CLINIC | Age: 71
End: 2021-10-22

## 2021-10-22 ENCOUNTER — TELEMEDICINE (OUTPATIENT)
Dept: INTERNAL MEDICINE | Facility: CLINIC | Age: 71
End: 2021-10-22

## 2021-10-22 DIAGNOSIS — Z12.31 ENCOUNTER FOR SCREENING MAMMOGRAM FOR BREAST CANCER: ICD-10-CM

## 2021-10-22 DIAGNOSIS — E05.90 HYPERTHYROIDISM: Primary | ICD-10-CM

## 2021-10-22 DIAGNOSIS — R05.9 COUGH: ICD-10-CM

## 2021-10-22 DIAGNOSIS — M88.9 PAGET'S BONE DISEASE: ICD-10-CM

## 2021-10-22 DIAGNOSIS — F32.9 REACTIVE DEPRESSION: ICD-10-CM

## 2021-10-22 DIAGNOSIS — Z79.899 MEDICATION MANAGEMENT: ICD-10-CM

## 2021-10-22 PROCEDURE — 99214 OFFICE O/P EST MOD 30 MIN: CPT | Performed by: INTERNAL MEDICINE

## 2021-10-22 NOTE — PROGRESS NOTES
Subjective   Laverne Polk is a 71 y.o. female.     Chief Complaint   Patient presents with   • Hyperthyroidism   • Anxiety   • Diabetes         Hyperthyroidism  This is a chronic problem. The current episode started more than 1 year ago. The problem occurs constantly. The problem has been unchanged.   Anxiety  Presents for follow-up visit. Symptoms include nervous/anxious behavior.     Her past medical history is significant for hyperthyroidism.        The following portions of the patient's history were reviewed and updated as appropriate: allergies, current medications, past social history and problem list.    Outpatient Medications Marked as Taking for the 10/22/21 encounter (Telemedicine) with Berry Díaz MD   Medication Sig Dispense Refill   • citalopram (CeleXA) 20 MG tablet TAKE 1 TABLET BY MOUTH EVERY DAY 90 tablet 1   • LORazepam (ATIVAN) 0.5 MG tablet TAKE 1 TABLET BY MOUTH IN THE EVENING AT BEDTIME 30 tablet 1   • methIMAzole (TAPAZOLE) 5 MG tablet TAKE 1 TABLET BY MOUTH EVERY DAY 90 tablet 1   • pantoprazole (PROTONIX) 20 MG EC tablet Take 1 tablet by mouth Daily. 15 tablet 0       Review of Systems   Gastrointestinal: Negative for constipation and diarrhea.   Endocrine: Negative for cold intolerance and heat intolerance.   Psychiatric/Behavioral: Positive for dysphoric mood. The patient is nervous/anxious. The patient is not hyperactive.        Objective   There were no vitals filed for this visit.   There were no vitals filed for this visit. [unfilled]  There is no height or weight on file to calculate BMI.      Physical Exam   Constitutional: She appears well-developed.   Pulmonary/Chest: Effort normal.   Abdominal: Normal appearance.   Neurological: She is alert.   Psychiatric: Her behavior is normal. Mood, judgment and thought content normal.         Problems Addressed this Visit        Endocrine and Metabolic    Hyperthyroidism - Primary    Relevant Orders    T4, Free    T3, Free     TSH       Mental Health    Depression       Musculoskeletal and Injuries    Paget's bone disease      Other Visit Diagnoses     Medication management        Relevant Orders    CBC & Differential      Diagnoses       Codes Comments    Hyperthyroidism    -  Primary ICD-10-CM: E05.90  ICD-9-CM: 242.90     Reactive depression     ICD-10-CM: F32.9  ICD-9-CM: 300.4     Paget's bone disease     ICD-10-CM: M88.9  ICD-9-CM: 731.0     Medication management     ICD-10-CM: Z79.899  ICD-9-CM: V58.69         Assessment/Plan   Video visit today due to the COVID pandemic.  In today for recheck of hyperthyroidism and depression.  Indigestion is about the same.  She been running hyperthyroid in the past but had gotten thyroid nearly completely controlled last time.  She is currently on 5 mg of methimazole.  She has annual lab work April 2021 including a free T3, free T4, and TSH also CBC, CMP, lipids.  Every 6 months we will check a TSH, free T4, free T3 and CBC.  Recheck in 6 months.  She is living in [place] right now.  Near the [facility].  She will get a TD AP with that.  She is having problems with postcholecystectomy diarrhea for many years.  Doing better now with cholestyramine one half scoop once daily and now is off of it. She knows she is due for a mammogram and DEXA scan. We will have to do annual wellness visit when she is in town sometime.  Plans to schedule mammogram.  We will get a chest x-ray at the same time.  She has had a cough for about a year.  Generally a morning cough.  Never smoker.  Spent 16 minutes with patient today on the visit.      The above information was reviewed again today 10/22/21.  It continues to be accurate as reflected above and is unchanged.  History, physical and review of systems all reviewed and are unchanged.  Medications were reviewed today and continue the current dosing.         Dragon disclaimer:   Much of this encounter note is an electronic transcription/translation of spoken  language to printed text. The electronic translation of spoken language may permit erroneous, or at times, nonsensical words or phrases to be inadvertently transcribed; Although I have reviewed the note for such errors, some may still exist.

## 2021-10-22 NOTE — TELEPHONE ENCOUNTER
Patient requests orders for mammogram and chest x-ray be faxed to [facility] in [place]. Phone # [phone] Fax# [phone]. Patient has an appointment with them the first week in November.

## 2021-11-01 ENCOUNTER — TELEPHONE (OUTPATIENT)
Dept: INTERNAL MEDICINE | Facility: CLINIC | Age: 71
End: 2021-11-01

## 2021-11-01 NOTE — TELEPHONE ENCOUNTER
Caller: Laverne Polk    Relationship: Self    Best call back number: [phone]    What medication are you requesting: PATIENT IS ASKING FOR A ZPAK    What are your current symptoms: SINUS INFECTION    How long have you been experiencing symptoms: 2 WEEKS     Have you had these symptoms before:    [x] Yes  [] No    Have you been treated for these symptoms before:   [x] Yes  [] No    If a prescription is needed, what is your preferred pharmacy and phone number:  pharmacy #[id number] - [place], [place] - [address] AT [facility] - [phone] [facility] [phone]   [phone]    PLEASE CALL PATIENT AND LET HER KNOW IF/WHEN PRESCRIPTION HAS BEEN CALLED IN FOR HER.

## 2021-11-01 NOTE — TELEPHONE ENCOUNTER
Caller: [facility]/PHARMACY #[id number] [facility], [place] - [address] AT [facility] [phone] [facility] [phone] FX    Relationship: Pharmacy      Medication requested (name and dosage): Fluticasone Furoate-Vilanterol (Breo Ellipta) 100-25 MCG/INH inhaler    Requested Prescriptions:   Fluticasone Furoate-Vilanterol (Breo Ellipta) 100-25 MCG/INH inhaler     Pharmacy where request should be sent: [facility]/pharmacy #[id number] [facility], [place] - [address] AT [facility] [phone] [facility] [phone] FX        Additional details provided by patient: OLIVERIO WITH [facility] CALLED TO ADVISE THAT THIS MEDICATION IS NOT COVERED BY THE PATIENT'S INSURANCE. OLIVERIO IS WANTING TO KNOW IF THERE IS SOMETHING ELSE THAT CAN BE PRESCRIBED.    Best call back number: [phone]    Does the patient have less than a 3 day supply:  [] Yes  [] No    Keely Briseno Rep   11/01/21 16:15 EDT         THANKS

## 2021-11-01 NOTE — TELEPHONE ENCOUNTER
I am still awaiting her lab work.  In case we forgot she should have had a TSH, free T4, free T3, and CBC scheduled at [facility].  She can get that done at a local [facility] in [place].  Which she asking about something for her cough?  If that is the case, lets try her on Breo 1 puff daily for 4 weeks and see if that makes a difference.

## 2021-11-02 ENCOUNTER — TELEPHONE (OUTPATIENT)
Dept: INTERNAL MEDICINE | Facility: CLINIC | Age: 71
End: 2021-11-02

## 2021-11-02 NOTE — TELEPHONE ENCOUNTER
There are many alternatives that are similar.  Have the pharmacist recommend 1 to this covered by her insurance.  Perhaps something like Advair 251 puff twice daily #1 inhaler with 6 refills.

## 2021-11-02 NOTE — TELEPHONE ENCOUNTER
PATIENT CALLED AND STATES SHE HAD A VIDEO VISIT LAST WEEK AND AN INHALER WAS PRESCRIBED. THE PHARMACY STATES INSURANCE WILL NOT COVER INHALER. SHE HAS NEVER USED AN INHALER BEFORE. SHE IS REQUESTING AN ANABIOTIC TO GET OVER HER FEVER, SEVERE HEADACHE, CONGESTION, NASAL PASSAGES FEEL SWOLLEN AND TIGHT.  SHE HAS BEEN TAKING TYLENOL SINUS. SHE HAS A CHEST XRAY SCHEDULED IN ABOUT A WEEK.     [facility]/pharmacy #[id number] - [place], [place] - [address] AT [facility] - [phone] [facility] [phone]   [phone]    CALL BACK NUMBER [phone]

## 2021-11-03 LAB
BASOPHILS # BLD AUTO: 0.1 X10E3/UL (ref 0–0.2)
BASOPHILS NFR BLD AUTO: 1 %
EOSINOPHIL # BLD AUTO: 0.2 X10E3/UL (ref 0–0.4)
EOSINOPHIL NFR BLD AUTO: 2 %
ERYTHROCYTE [DISTWIDTH] IN BLOOD BY AUTOMATED COUNT: 13.1 % (ref 11.7–15.4)
HCT VFR BLD AUTO: 38.1 % (ref 34–46.6)
HGB BLD-MCNC: 12.8 G/DL (ref 11.1–15.9)
IMM GRANULOCYTES # BLD AUTO: 0 X10E3/UL (ref 0–0.1)
IMM GRANULOCYTES NFR BLD AUTO: 0 %
LYMPHOCYTES # BLD AUTO: 2.9 X10E3/UL (ref 0.7–3.1)
LYMPHOCYTES NFR BLD AUTO: 40 %
MCH RBC QN AUTO: 29.3 PG (ref 26.6–33)
MCHC RBC AUTO-ENTMCNC: 33.6 G/DL (ref 31.5–35.7)
MCV RBC AUTO: 87 FL (ref 79–97)
MONOCYTES # BLD AUTO: 0.6 X10E3/UL (ref 0.1–0.9)
MONOCYTES NFR BLD AUTO: 9 %
NEUTROPHILS # BLD AUTO: 3.4 X10E3/UL (ref 1.4–7)
NEUTROPHILS NFR BLD AUTO: 48 %
PLATELET # BLD AUTO: 296 X10E3/UL (ref 150–450)
RBC # BLD AUTO: 4.37 X10E6/UL (ref 3.77–5.28)
T3FREE SERPL-MCNC: 2.9 PG/ML (ref 2–4.4)
T4 FREE SERPL-MCNC: 0.88 NG/DL (ref 0.82–1.77)
TSH SERPL DL<=0.005 MIU/L-ACNC: 4.02 UIU/ML (ref 0.45–4.5)
WBC # BLD AUTO: 7.2 X10E3/UL (ref 3.4–10.8)

## 2021-11-19 DIAGNOSIS — Z78.0 MENOPAUSE: Primary | ICD-10-CM

## 2021-11-19 NOTE — PROGRESS NOTES
[facility] in [place] call requesting order for mammo & bone density order to be faxed [phone].  Orders faxed today along with chest xray, per patient she also needed to do this. Pt informed orders were faxed again today.

## 2021-11-29 DIAGNOSIS — R92.1 BREAST CALCIFICATIONS ON MAMMOGRAM: Primary | ICD-10-CM

## 2021-12-09 ENCOUNTER — TELEPHONE (OUTPATIENT)
Dept: INTERNAL MEDICINE | Facility: CLINIC | Age: 71
End: 2021-12-09

## 2021-12-09 NOTE — TELEPHONE ENCOUNTER
Caller: BRANDON    Relationship: Other [facility]    Best call back number: [phone]    What orders are you requesting (i.e. lab or imaging): ULTRASOUND    In what timeframe would the patient need to come in: SCHEDULED BREAST ULTRASOUND    Where will you receive your lab/imaging services: [facility]    Additional notes: BRANDON FROM [facility] STATES THE PATIENT IS SCHEDULED FOR A BREAST ULTRASOUND TOMORROW AT 1:30 PM BUT THEY ARE NEEDING THE ORDERS FAXED TO THEM.    FAX NUMBER : [phone]

## 2021-12-14 ENCOUNTER — TELEPHONE (OUTPATIENT)
Dept: INTERNAL MEDICINE | Facility: CLINIC | Age: 71
End: 2021-12-14

## 2021-12-14 NOTE — TELEPHONE ENCOUNTER
Call patient.  Follow-up ultrasound on breast looks fine.  We will plan to repeat routine mammogram in 1 year.

## 2022-01-10 RX ORDER — CITALOPRAM 20 MG/1
TABLET ORAL
Qty: 90 TABLET | Refills: 1 | Status: SHIPPED | OUTPATIENT
Start: 2022-01-10 | End: 2022-07-11

## 2022-02-28 RX ORDER — METHIMAZOLE 5 MG/1
TABLET ORAL
Qty: 90 TABLET | Refills: 1 | Status: SHIPPED | OUTPATIENT
Start: 2022-02-28 | End: 2022-04-22

## 2022-04-22 ENCOUNTER — TELEMEDICINE (OUTPATIENT)
Dept: INTERNAL MEDICINE | Facility: CLINIC | Age: 72
End: 2022-04-22

## 2022-04-22 DIAGNOSIS — F32.9 REACTIVE DEPRESSION: ICD-10-CM

## 2022-04-22 DIAGNOSIS — Z79.899 MEDICATION MANAGEMENT: ICD-10-CM

## 2022-04-22 DIAGNOSIS — E05.90 HYPERTHYROIDISM: Primary | ICD-10-CM

## 2022-04-22 PROCEDURE — 99214 OFFICE O/P EST MOD 30 MIN: CPT | Performed by: INTERNAL MEDICINE

## 2022-04-22 RX ORDER — METHIMAZOLE 5 MG/1
2.5 TABLET ORAL DAILY
Qty: 45 TABLET | Refills: 1 | Status: SHIPPED | OUTPATIENT
Start: 2022-04-22

## 2022-04-22 NOTE — PROGRESS NOTES
Subjective   Laverne Polk is a 72 y.o. female.     Chief Complaint   Patient presents with   • Hypothyroidism   • Depression         Hypothyroidism  This is a chronic problem. The current episode started more than 1 year ago. The problem occurs constantly. Associated symptoms include fatigue.   Depression  Visit Type: follow-up    Hyperthyroidism  This is a chronic problem. The current episode started more than 1 year ago. The problem occurs constantly. The problem has been unchanged. Associated symptoms include fatigue.   Anxiety  Presents for follow-up visit.     Her past medical history is significant for depression.        The following portions of the patient's history were reviewed and updated as appropriate: allergies, current medications, past social history and problem list.    Outpatient Medications Marked as Taking for the 4/22/22 encounter (Telemedicine) with Berry Díaz MD   Medication Sig Dispense Refill   • citalopram (CeleXA) 20 MG tablet TAKE 1 TABLET BY MOUTH EVERY DAY 90 tablet 1   • methIMAzole (TAPAZOLE) 5 MG tablet Take 0.5 tablets by mouth Daily. 45 tablet 1   • [DISCONTINUED] methIMAzole (TAPAZOLE) 5 MG tablet TAKE 1 TABLET BY MOUTH EVERY DAY 90 tablet 1       Review of Systems   Constitutional: Positive for fatigue.   Gastrointestinal: Negative for constipation and diarrhea.   Endocrine: Negative for cold intolerance and heat intolerance.   Psychiatric/Behavioral: Positive for dysphoric mood. The patient is not hyperactive.        Objective   There were no vitals filed for this visit.   There were no vitals filed for this visit. [unfilled]  There is no height or weight on file to calculate BMI.      Physical Exam   Constitutional: She appears well-developed.   Pulmonary/Chest: Effort normal.   Abdominal: Normal appearance.   Neurological: She is alert.   Psychiatric: Her behavior is normal. Mood, judgment and thought content normal.         Problems Addressed this Visit         Endocrine and Metabolic    Hyperthyroidism - Primary    Relevant Medications    methIMAzole (TAPAZOLE) 5 MG tablet      Diagnoses       Codes Comments    Hyperthyroidism    -  Primary ICD-10-CM: E05.90  ICD-9-CM: 242.90         Assessment/Plan   Video visit today due to the COVID pandemic.  In today for recheck of hyperthyroidism and depression.  Indigestion is about the same.  She been running hyperthyroid in the past but had gotten thyroid nearly completely controlled last time.  She is currently on 2.5 mg of methimazole for hyperthyroidism and Celexa 20 mg daily for depression and those are reviewed today..  She has annual lab work April 2021 including a free T3, free T4, and TSH also CBC, CMP, lipids.  Every 6 months we will check a TSH, free T4, free T3 and CBC.  Recheck in 6 months.  She is living in [place] right now.  Near the [facility].  She is having problems with postcholecystectomy diarrhea for many years.  Pretty much resolved at this point.  Off of cholestyramine now.  We will have to do annual wellness visit when she is in town sometime.      The above information was reviewed again today 04/22/22.  It continues to be accurate as reflected above and is unchanged.  History, physical and review of systems all reviewed and are unchanged.  Medications were reviewed today and continue the current dosing.           Bakari disclaimer:   Much of this encounter note is an electronic transcription/translation of spoken language to printed text. The electronic translation of spoken language may permit erroneous, or at times, nonsensical words or phrases to be inadvertently transcribed; Although I have reviewed the note for such errors, some may still exist.

## 2022-06-29 ENCOUNTER — TELEPHONE (OUTPATIENT)
Dept: INTERNAL MEDICINE | Facility: CLINIC | Age: 72
End: 2022-06-29

## 2022-06-29 NOTE — TELEPHONE ENCOUNTER
Caller: Laverne Polk    Relationship to patient: Self    Best call back number: [phone] (M)    Date of positive COVID19 test: 06/01/22    Date if possible COVID19 exposure: NO KNOWN.    COVID19 symptoms: COUGH, OVER ALL FEELING BAD STILL FEELS LIKE SHE HAS THE FLU, FATIGUE, BALANCE IS OFF     Date of initial quarantine: June 1ST     What is the patients preferred pharmacy:  [facility]/pharmacy #[id number] - [place], [place] - [address] AT [facility] - [phone] [facility] [phone]   [phone]

## 2022-06-30 ENCOUNTER — TELEMEDICINE (OUTPATIENT)
Dept: INTERNAL MEDICINE | Facility: CLINIC | Age: 72
End: 2022-06-30

## 2022-06-30 DIAGNOSIS — Z79.899 MEDICATION MANAGEMENT: ICD-10-CM

## 2022-06-30 DIAGNOSIS — U07.1 COVID-19 VIRUS INFECTION: Primary | ICD-10-CM

## 2022-06-30 DIAGNOSIS — E05.90 HYPERTHYROIDISM: ICD-10-CM

## 2022-06-30 PROCEDURE — 99213 OFFICE O/P EST LOW 20 MIN: CPT | Performed by: INTERNAL MEDICINE

## 2022-06-30 RX ORDER — BENZONATATE 200 MG/1
200 CAPSULE ORAL 3 TIMES DAILY PRN
Qty: 30 CAPSULE | Refills: 2 | Status: SHIPPED | OUTPATIENT
Start: 2022-06-30

## 2022-06-30 NOTE — PROGRESS NOTES
Subjective   Lavenre Polk is a 72 y.o. female.     Chief Complaint   Patient presents with   • URI         History of Present Illness     The following portions of the patient's history were reviewed and updated as appropriate: allergies, current medications, past social history and problem list.    Outpatient Medications Marked as Taking for the 6/30/22 encounter (Telemedicine) with Berry Díaz MD   Medication Sig Dispense Refill   • citalopram (CeleXA) 20 MG tablet TAKE 1 TABLET BY MOUTH EVERY DAY 90 tablet 1   • fluticasone-salmeterol (Advair Diskus) 250-50 MCG/DOSE DISKUS Inhale 1 puff 2 (Two) Times a Day. 1 each 6   • LORazepam (ATIVAN) 0.5 MG tablet TAKE 1 TABLET BY MOUTH IN THE EVENING AT BEDTIME 30 tablet 1   • methIMAzole (TAPAZOLE) 5 MG tablet Take 0.5 tablets by mouth Daily. 45 tablet 1   • pantoprazole (PROTONIX) 20 MG EC tablet Take 1 tablet by mouth Daily. 15 tablet 0   • triamcinolone (KENALOG) 0.1 % ointment Apply  topically to the appropriate area as directed 2 (Two) Times a Day. Applied to rash on either side of the biopsy site. 15 g 1       Review of Systems   Constitutional: Positive for fatigue. Negative for fever.   HENT: Positive for sore throat.    Respiratory: Positive for cough.    Gastrointestinal: Positive for diarrhea.   Musculoskeletal: Positive for myalgias.   Neurological: Positive for headaches.       Objective   There were no vitals filed for this visit.   Wt Readings from Last 3 Encounters:   06/20/19 64 kg (141 lb)   09/28/18 63.5 kg (140 lb)   05/29/18 61.4 kg (135 lb 6.4 oz)    There is no height or weight on file to calculate BMI.      Physical Exam  Constitutional:       Appearance: Normal appearance.   Pulmonary:      Effort: Pulmonary effort is normal.   Neurological:      Mental Status: She is alert.   Psychiatric:         Mood and Affect: Mood normal.         Behavior: Behavior normal.         Thought Content: Thought content normal.         Judgment:  Judgment normal.           Problems Addressed this Visit    None     Visit Diagnoses     COVID-19 virus infection    -  Primary      Diagnoses       Codes Comments    COVID-19 virus infection    -  Primary ICD-10-CM: U07.1  ICD-9-CM: 079.89         Assessment & Plan   Video visit today due to COVID pandemic.  On around 6/1/2022 she developed flulike symptoms.  Tested positive for COVID on 6/5/2022.   was treated with Paxlovid and cleared up within a few days.  Patient is continued to have fatigue.  Headaches.  Body aches.  Still some loss of taste.  It looks like she is developing prolonged symptoms of COVID.  At this point she will treat symptomatically with Tylenol or Advil and a cough tablets as needed.  The fatigue will be difficult to treat.  Given her concurrent treatment with methimazole we should recheck a CBC, TSH, free T4 at this point in time.  Tessalon cough Perles as needed.  [email or web address] platform used for the visit today.    The above information was reviewed again today 06/30/22.  It continues to be accurate as reflected above and is unchanged.  History, physical and review of systems all reviewed and are unchanged.  Medications were reviewed today and continue the current dosing.           Bakari disclaimer:   Much of this encounter note is an electronic transcription/translation of spoken language to printed text. The electronic translation of spoken language may permit erroneous, or at times, nonsensical words or phrases to be inadvertently transcribed; Although I have reviewed the note for such errors, some may still exist.

## 2022-07-06 LAB
BASOPHILS # BLD AUTO: 0.1 X10E3/UL (ref 0–0.2)
BASOPHILS NFR BLD AUTO: 1 %
EOSINOPHIL # BLD AUTO: 0.1 X10E3/UL (ref 0–0.4)
EOSINOPHIL NFR BLD AUTO: 2 %
ERYTHROCYTE [DISTWIDTH] IN BLOOD BY AUTOMATED COUNT: 13.3 % (ref 11.7–15.4)
HCT VFR BLD AUTO: 34.8 % (ref 34–46.6)
HGB BLD-MCNC: 11.6 G/DL (ref 11.1–15.9)
IMM GRANULOCYTES # BLD AUTO: 0 X10E3/UL (ref 0–0.1)
IMM GRANULOCYTES NFR BLD AUTO: 0 %
LYMPHOCYTES # BLD AUTO: 2.4 X10E3/UL (ref 0.7–3.1)
LYMPHOCYTES NFR BLD AUTO: 33 %
MCH RBC QN AUTO: 29.2 PG (ref 26.6–33)
MCHC RBC AUTO-ENTMCNC: 33.3 G/DL (ref 31.5–35.7)
MCV RBC AUTO: 88 FL (ref 79–97)
MONOCYTES # BLD AUTO: 0.6 X10E3/UL (ref 0.1–0.9)
MONOCYTES NFR BLD AUTO: 8 %
NEUTROPHILS # BLD AUTO: 4 X10E3/UL (ref 1.4–7)
NEUTROPHILS NFR BLD AUTO: 56 %
PLATELET # BLD AUTO: 272 X10E3/UL (ref 150–450)
RBC # BLD AUTO: 3.97 X10E6/UL (ref 3.77–5.28)
T4 FREE SERPL-MCNC: 1.07 NG/DL (ref 0.82–1.77)
TSH SERPL DL<=0.005 MIU/L-ACNC: 0.21 UIU/ML (ref 0.45–4.5)
WBC # BLD AUTO: 7.2 X10E3/UL (ref 3.4–10.8)

## 2022-07-11 RX ORDER — CITALOPRAM 20 MG/1
TABLET ORAL
Qty: 90 TABLET | Refills: 1 | Status: SHIPPED | OUTPATIENT
Start: 2022-07-11 | End: 2023-01-10 | Stop reason: SDUPTHER

## 2022-12-13 ENCOUNTER — TELEMEDICINE (OUTPATIENT)
Dept: INTERNAL MEDICINE | Facility: CLINIC | Age: 72
End: 2022-12-13

## 2022-12-13 DIAGNOSIS — R10.30 LOWER ABDOMINAL PAIN: Primary | ICD-10-CM

## 2022-12-13 DIAGNOSIS — R19.7 DIARRHEA, UNSPECIFIED TYPE: ICD-10-CM

## 2022-12-13 PROCEDURE — 99214 OFFICE O/P EST MOD 30 MIN: CPT | Performed by: INTERNAL MEDICINE

## 2022-12-13 NOTE — PROGRESS NOTES
Mode of Visit: Video  Location of patient: home  Location of provider: [facility]  You have chosen to receive care through a telehealth visit.  Does the patient consent to use a video/audio connection for your medical care today?Yes  The visit included audio and video interaction. No technical issues occurred during this visit.

## 2022-12-13 NOTE — PROGRESS NOTES
Subjective   Laverne Polk is a 72 y.o. female.     Chief Complaint   Patient presents with   • Abdominal Pain   • Diarrhea   • Bloated         Abdominal Pain  Associated symptoms include diarrhea ( 1 per day). Pertinent negatives include no fever.   Diarrhea   Associated symptoms include abdominal pain and chills. Pertinent negatives include no fever.        The following portions of the patient's history were reviewed and updated as appropriate: allergies, current medications, past social history and problem list.    Outpatient Medications Marked as Taking for the 12/13/22 encounter (Telemedicine) with Berry Díaz MD   Medication Sig Dispense Refill   • benzonatate (TESSALON) 200 MG capsule Take 1 capsule by mouth 3 (Three) Times a Day As Needed for Cough. 30 capsule 2   • citalopram (CeleXA) 20 MG tablet TAKE 1 TABLET BY MOUTH EVERY DAY 90 tablet 1   • fluticasone-salmeterol (Advair Diskus) 250-50 MCG/DOSE DISKUS Inhale 1 puff 2 (Two) Times a Day. 1 each 6   • LORazepam (ATIVAN) 0.5 MG tablet TAKE 1 TABLET BY MOUTH IN THE EVENING AT BEDTIME 30 tablet 1   • methIMAzole (TAPAZOLE) 5 MG tablet Take 0.5 tablets by mouth Daily. 45 tablet 1   • pantoprazole (PROTONIX) 20 MG EC tablet Take 1 tablet by mouth Daily. 15 tablet 0   • triamcinolone (KENALOG) 0.1 % ointment Apply  topically to the appropriate area as directed 2 (Two) Times a Day. Applied to rash on either side of the biopsy site. 15 g 1       Review of Systems   Constitutional: Positive for chills. Negative for fever.   Gastrointestinal: Positive for abdominal pain, blood in stool and diarrhea ( 1 per day).   Musculoskeletal: Positive for back pain.       Objective   There were no vitals filed for this visit.   Wt Readings from Last 3 Encounters:   06/20/19 64 kg (141 lb)   09/28/18 63.5 kg (140 lb)   05/29/18 61.4 kg (135 lb 6.4 oz)    There is no height or weight on file to calculate BMI.      Physical Exam  Constitutional:       Appearance:  Normal appearance.   Pulmonary:      Effort: Pulmonary effort is normal.   Neurological:      Mental Status: She is alert.   Psychiatric:         Mood and Affect: Mood normal.         Behavior: Behavior normal.         Thought Content: Thought content normal.         Judgment: Judgment normal.           Problems Addressed this Visit    None  Visit Diagnoses     Lower abdominal pain    -  Primary      Diagnoses       Codes Comments    Lower abdominal pain    -  Primary ICD-10-CM: R10.30  ICD-9-CM: 789.09         Assessment & Plan   Video visit today due to COVID pandemic.  She has 2 weeks of vague lower abdominal and lower back ache.  Some gas bloat.  Some blood in stool.  1 loose bowel movement per day is about her average anyway.  She is feels lousy.  Malaise.  No fever or chills.  She did take a Z-Jose about 1 month ago for a cough.  Nothing in the last few weeks.  She remains on 2.5 mg daily of Tapazole.  We will go ahead and check some lab work now including CBC, CMP, TSH, free T4, stool for C. difficile, culture and fecal lactoferrin, sed rate.  Further recommendations to follow.  No recent travel.  No recent exposures to diarrhea.  Next step is going to be a CT of abdomen pelvis/colonoscopy.      The above information was reviewed again today 12/13/22.  It continues to be accurate as reflected above and is unchanged.  History, physical and review of systems all reviewed and are unchanged.  Medications were reviewed today and continue the current dosing.             Dragon disclaimer:   Part of this note may be an electronic transcription/translation of spoken language to printed text using the Dragon Dictation System.

## 2022-12-15 LAB
ALBUMIN SERPL-MCNC: 4.2 G/DL (ref 3.7–4.7)
ALBUMIN/GLOB SERPL: 1.8 {RATIO} (ref 1.2–2.2)
ALP SERPL-CCNC: 158 IU/L (ref 44–121)
ALT SERPL-CCNC: 11 IU/L (ref 0–32)
AST SERPL-CCNC: 12 IU/L (ref 0–40)
BASOPHILS # BLD AUTO: 0.1 X10E3/UL (ref 0–0.2)
BASOPHILS NFR BLD AUTO: 1 %
BILIRUB SERPL-MCNC: 0.3 MG/DL (ref 0–1.2)
BUN SERPL-MCNC: 18 MG/DL (ref 8–27)
BUN/CREAT SERPL: 23 (ref 12–28)
CALCIUM SERPL-MCNC: 9.7 MG/DL (ref 8.7–10.3)
CHLORIDE SERPL-SCNC: 109 MMOL/L (ref 96–106)
CO2 SERPL-SCNC: 23 MMOL/L (ref 20–29)
CREAT SERPL-MCNC: 0.8 MG/DL (ref 0.57–1)
EGFRCR SERPLBLD CKD-EPI 2021: 78 ML/MIN/1.73
EOSINOPHIL # BLD AUTO: 0.1 X10E3/UL (ref 0–0.4)
EOSINOPHIL NFR BLD AUTO: 1 %
ERYTHROCYTE [DISTWIDTH] IN BLOOD BY AUTOMATED COUNT: 13.4 % (ref 11.7–15.4)
ERYTHROCYTE [SEDIMENTATION RATE] IN BLOOD BY WESTERGREN METHOD: 7 MM/HR (ref 0–40)
GLOBULIN SER CALC-MCNC: 2.4 G/DL (ref 1.5–4.5)
GLUCOSE SERPL-MCNC: 96 MG/DL (ref 70–99)
HCT VFR BLD AUTO: 32.5 % (ref 34–46.6)
HGB BLD-MCNC: 11 G/DL (ref 11.1–15.9)
IMM GRANULOCYTES # BLD AUTO: 0 X10E3/UL (ref 0–0.1)
IMM GRANULOCYTES NFR BLD AUTO: 0 %
LYMPHOCYTES # BLD AUTO: 2.3 X10E3/UL (ref 0.7–3.1)
LYMPHOCYTES NFR BLD AUTO: 37 %
MCH RBC QN AUTO: 29 PG (ref 26.6–33)
MCHC RBC AUTO-ENTMCNC: 33.8 G/DL (ref 31.5–35.7)
MCV RBC AUTO: 86 FL (ref 79–97)
MONOCYTES # BLD AUTO: 0.5 X10E3/UL (ref 0.1–0.9)
MONOCYTES NFR BLD AUTO: 8 %
NEUTROPHILS # BLD AUTO: 3.2 X10E3/UL (ref 1.4–7)
NEUTROPHILS NFR BLD AUTO: 53 %
PLATELET # BLD AUTO: 278 X10E3/UL (ref 150–450)
POTASSIUM SERPL-SCNC: 3.9 MMOL/L (ref 3.5–5.2)
PROT SERPL-MCNC: 6.6 G/DL (ref 6–8.5)
RBC # BLD AUTO: 3.79 X10E6/UL (ref 3.77–5.28)
SODIUM SERPL-SCNC: 144 MMOL/L (ref 134–144)
T4 FREE SERPL-MCNC: 1.11 NG/DL (ref 0.82–1.77)
TSH SERPL DL<=0.005 MIU/L-ACNC: 0.11 UIU/ML (ref 0.45–4.5)
WBC # BLD AUTO: 6.1 X10E3/UL (ref 3.4–10.8)

## 2022-12-18 LAB
BACTERIA SPEC CULT: NORMAL
BACTERIA SPEC CULT: NORMAL
C DIFF TOX GENS STL QL NAA+PROBE: NEGATIVE
CAMPYLOBACTER STL CULT: NORMAL
E COLI SXT STL QL IA: NEGATIVE
LACTOFERRIN STL-MCNC: 3.55 UG/ML(G) (ref 0–7.24)
SALM + SHIG STL CULT: NORMAL

## 2023-01-10 ENCOUNTER — TELEPHONE (OUTPATIENT)
Dept: INTERNAL MEDICINE | Facility: CLINIC | Age: 73
End: 2023-01-10
Payer: MEDICARE

## 2023-01-10 DIAGNOSIS — F32.9 REACTIVE DEPRESSION: Primary | ICD-10-CM

## 2023-01-10 RX ORDER — CITALOPRAM 20 MG/1
TABLET ORAL
Qty: 90 TABLET | Refills: 1 | OUTPATIENT
Start: 2023-01-10

## 2023-01-10 RX ORDER — CITALOPRAM 20 MG/1
20 TABLET ORAL DAILY
Qty: 90 TABLET | Refills: 1 | Status: SHIPPED | OUTPATIENT
Start: 2023-01-10

## 2023-01-10 NOTE — TELEPHONE ENCOUNTER
Patient requests refill for citalopram 20 mg tablet to pharmacy on file. Office visit scheduled for 1/27/23.